# Patient Record
Sex: FEMALE | Race: OTHER | HISPANIC OR LATINO | URBAN - METROPOLITAN AREA
[De-identification: names, ages, dates, MRNs, and addresses within clinical notes are randomized per-mention and may not be internally consistent; named-entity substitution may affect disease eponyms.]

---

## 2022-12-14 ENCOUNTER — EMERGENCY (EMERGENCY)
Facility: HOSPITAL | Age: 47
LOS: 1 days | Discharge: HOME | End: 2022-12-14
Attending: STUDENT IN AN ORGANIZED HEALTH CARE EDUCATION/TRAINING PROGRAM | Admitting: STUDENT IN AN ORGANIZED HEALTH CARE EDUCATION/TRAINING PROGRAM

## 2022-12-14 VITALS
HEART RATE: 89 BPM | SYSTOLIC BLOOD PRESSURE: 97 MMHG | DIASTOLIC BLOOD PRESSURE: 62 MMHG | OXYGEN SATURATION: 100 % | RESPIRATION RATE: 18 BRPM

## 2022-12-14 VITALS
DIASTOLIC BLOOD PRESSURE: 52 MMHG | TEMPERATURE: 98 F | RESPIRATION RATE: 18 BRPM | SYSTOLIC BLOOD PRESSURE: 82 MMHG | OXYGEN SATURATION: 100 % | HEART RATE: 100 BPM

## 2022-12-14 DIAGNOSIS — Z98.84 BARIATRIC SURGERY STATUS: Chronic | ICD-10-CM

## 2022-12-14 LAB
ALBUMIN SERPL ELPH-MCNC: 3.8 G/DL — SIGNIFICANT CHANGE UP (ref 3.3–5)
ALP SERPL-CCNC: 80 U/L — SIGNIFICANT CHANGE UP (ref 40–120)
ALT FLD-CCNC: 34 U/L — HIGH (ref 4–33)
ANION GAP SERPL CALC-SCNC: 14 MMOL/L — SIGNIFICANT CHANGE UP (ref 7–14)
AST SERPL-CCNC: 28 U/L — SIGNIFICANT CHANGE UP (ref 4–32)
BASE EXCESS BLDV CALC-SCNC: 0.9 MMOL/L — SIGNIFICANT CHANGE UP (ref -2–3)
BILIRUB SERPL-MCNC: 0.4 MG/DL — SIGNIFICANT CHANGE UP (ref 0.2–1.2)
BLD GP AB SCN SERPL QL: NEGATIVE — SIGNIFICANT CHANGE UP
BLOOD GAS VENOUS COMPREHENSIVE RESULT: SIGNIFICANT CHANGE UP
BUN SERPL-MCNC: 10 MG/DL — SIGNIFICANT CHANGE UP (ref 7–23)
CALCIUM SERPL-MCNC: 8.9 MG/DL — SIGNIFICANT CHANGE UP (ref 8.4–10.5)
CHLORIDE BLDV-SCNC: 105 MMOL/L — SIGNIFICANT CHANGE UP (ref 96–108)
CHLORIDE SERPL-SCNC: 103 MMOL/L — SIGNIFICANT CHANGE UP (ref 98–107)
CO2 BLDV-SCNC: 29 MMOL/L — HIGH (ref 22–26)
CO2 SERPL-SCNC: 20 MMOL/L — LOW (ref 22–31)
CREAT SERPL-MCNC: 0.52 MG/DL — SIGNIFICANT CHANGE UP (ref 0.5–1.3)
EGFR: 115 ML/MIN/1.73M2 — SIGNIFICANT CHANGE UP
FLUAV AG NPH QL: SIGNIFICANT CHANGE UP
FLUBV AG NPH QL: SIGNIFICANT CHANGE UP
GAS PNL BLDV: 135 MMOL/L — LOW (ref 136–145)
GAS PNL BLDV: SIGNIFICANT CHANGE UP
GLUCOSE BLDV-MCNC: 77 MG/DL — SIGNIFICANT CHANGE UP (ref 70–99)
GLUCOSE SERPL-MCNC: 79 MG/DL — SIGNIFICANT CHANGE UP (ref 70–99)
HCO3 BLDV-SCNC: 27 MMOL/L — SIGNIFICANT CHANGE UP (ref 22–29)
HCT VFR BLD CALC: 32.7 % — LOW (ref 34.5–45)
HCT VFR BLDA CALC: 30 % — LOW (ref 34.5–46.5)
HGB BLD CALC-MCNC: 10.1 G/DL — LOW (ref 11.5–15.5)
HGB BLD-MCNC: 9.7 G/DL — LOW (ref 11.5–15.5)
INR BLD: 1.1 RATIO — SIGNIFICANT CHANGE UP (ref 0.88–1.16)
LACTATE BLDV-MCNC: 1.1 MMOL/L — SIGNIFICANT CHANGE UP (ref 0.5–2)
MCHC RBC-ENTMCNC: 21.4 PG — LOW (ref 27–34)
MCHC RBC-ENTMCNC: 29.7 GM/DL — LOW (ref 32–36)
MCV RBC AUTO: 72 FL — LOW (ref 80–100)
NRBC # BLD: 0 /100 WBCS — SIGNIFICANT CHANGE UP (ref 0–0)
NRBC # FLD: 0 K/UL — SIGNIFICANT CHANGE UP (ref 0–0)
PCO2 BLDV: 52 MMHG — HIGH (ref 39–42)
PH BLDV: 7.33 — SIGNIFICANT CHANGE UP (ref 7.32–7.43)
PLATELET # BLD AUTO: 387 K/UL — SIGNIFICANT CHANGE UP (ref 150–400)
PO2 BLDV: 35 MMHG — SIGNIFICANT CHANGE UP
POTASSIUM BLDV-SCNC: 4 MMOL/L — SIGNIFICANT CHANGE UP (ref 3.5–5.1)
POTASSIUM SERPL-MCNC: 4.1 MMOL/L — SIGNIFICANT CHANGE UP (ref 3.5–5.3)
POTASSIUM SERPL-SCNC: 4.1 MMOL/L — SIGNIFICANT CHANGE UP (ref 3.5–5.3)
PROT SERPL-MCNC: 7.1 G/DL — SIGNIFICANT CHANGE UP (ref 6–8.3)
PROTHROM AB SERPL-ACNC: 12.8 SEC — SIGNIFICANT CHANGE UP (ref 10.5–13.4)
RBC # BLD: 4.54 M/UL — SIGNIFICANT CHANGE UP (ref 3.8–5.2)
RBC # FLD: 16.9 % — HIGH (ref 10.3–14.5)
RH IG SCN BLD-IMP: POSITIVE — SIGNIFICANT CHANGE UP
RSV RNA NPH QL NAA+NON-PROBE: SIGNIFICANT CHANGE UP
SAO2 % BLDV: 57.6 % — SIGNIFICANT CHANGE UP
SARS-COV-2 RNA SPEC QL NAA+PROBE: SIGNIFICANT CHANGE UP
SODIUM SERPL-SCNC: 137 MMOL/L — SIGNIFICANT CHANGE UP (ref 135–145)
WBC # BLD: 7.95 K/UL — SIGNIFICANT CHANGE UP (ref 3.8–10.5)
WBC # FLD AUTO: 7.95 K/UL — SIGNIFICANT CHANGE UP (ref 3.8–10.5)

## 2022-12-14 PROCEDURE — 99284 EMERGENCY DEPT VISIT MOD MDM: CPT

## 2022-12-14 NOTE — ED PROVIDER NOTE - CLINICAL SUMMARY MEDICAL DECISION MAKING FREE TEXT BOX
47-year-old female past medical history of "mini sleeve" bariatric surgery at [facility] presents to the ER for abnormal labs.  Patient was told she likely has low hemoglobin when receiving B12 shot today for progressive weakness, palpitations yesterday, and exertional shortness of breath.  Patient denies any abdominal pain at this time.  History of multiple transfusions in the past and states worsened after her Saxenda dose was increased to 2.6 mg.  Patient states that her  is sick at home and has been coughing.  Likely anemia due to malabsorption versus low hemoglobin, patient states she did not have a recent hemoglobin to corroborate.  Will obtain CBC, CMP, VBG.  PRBC as needed.  Will screen for viral etiology with RVP.  Symptomatic control as needed.  No current concern for acute structural issue due to bariatric surgery.

## 2022-12-14 NOTE — ED PROVIDER NOTE - OBJECTIVE STATEMENT
47-year-old female past medical history of "mini sleeve" bariatric surgery at [facility] presents to the ER for abnormal labs.  Patient was told she likely has low hemoglobin when receiving B12 shot today for progressive weakness, palpitations yesterday, and exertional shortness of breath.  Patient denies any abdominal pain at this time.  History of multiple transfusions in the past and states worsened after her Saxenda dose was increased to 2.6 mg.  No other current complaints at this time. 47-year-old female past medical history of "mini sleeve" bariatric surgery at [facility] presents to the ER for abnormal labs.  Patient was told she has a low hemoglobin last week of 10, received B12 shot today for progressive weakness and fatigue for past 2 weeks. Pt c/o chronic headache and chronic palpitations worse last few days. Associated with new exertional shortness of breath for past days since increasing Saxenda to 2.6mg and pt believes is a side effect.  Patient denies any abdominal pain at this time or melena.  History of a blood transfusion in the past.  No other current complaints at this time. No ocps or estrogen use, recent surgeries, hospitalizations, le edema, calf pain, hx of dvt/pe. Traveled to [place] however had sx prior to travel.  is sick at home with cough and cold.

## 2022-12-14 NOTE — ED PROVIDER NOTE - CARE PLAN
1 Principal Discharge DX:	Shortness of breath  Secondary Diagnosis:	Palpitations  Secondary Diagnosis:	Fatigue

## 2022-12-14 NOTE — ED PROVIDER NOTE - PROGRESS NOTE DETAILS
JUAN Aguilar- h/h stable. Pt seen and evaluated, states she does not want to wait for cardiac labs, tsh, cxr, or ekg for her SOB and palpitations. states she has felt this way for awhile and has to see her pmd and gastric sleeve surgeon anyway and would prefer to follow up outpatient. states she was most concerned about her hg. will dc home.

## 2022-12-14 NOTE — ED PROVIDER NOTE - PHYSICAL EXAMINATION
GEN - NAD; A&O x3   HEAD - NC/AT   EYES- PERRL, EOMI, pale conjunctiva  ENT: Airway patent, mmm, Oral cavity and pharynx normal. No inflammation, swelling, exudate, or lesions.  NECK: Neck supple, non-tender without lymphadenopathy, no masses.  PULMONARY - CTA b/l, symmetric breath sounds. No W/R/R.  CARDIAC -s1s2, RRR, no M,G,R, No JVD  ABDOMEN - +BS, ND, NT, soft, no guarding, no rebound, no masses , no rigidity  EXTREMITIES - FROM, symmetric pulses, capillary refill < 2 seconds, no edema, 5/5 strength in b/l UE and LE  SKIN - no rash or bruising   NEUROLOGIC - alert, speech clear, no focal deficits  PSYCH -nl mood/affect, nl insight.

## 2022-12-14 NOTE — ED PROVIDER NOTE - NS ED ROS FT
ROS   GENERAL: No fever, no chills, + malaise  ENT: No earache, no coryza, no sore throat   NECK: No stiffness, no swollen glands, no dysphagia   RESPIRATORY: No cough, + intermittent dyspnea, no pleuritic chest pain   HEART: no chest pain, + intermittent palpitations, no edema, no jvd   ABDOMEN: No abdominal pain, no nausea, no vomiting, no diarrhea   : No dysuria, no increase frequency, no urgency, No discharge   MUSCULAR-SKELETAL: No myalgia, no arthralgia   NEUROLOGY: No headache, no vertigo, no paresthesia, no focal deficits, no diplopia   SKIN: No rash, no evidence of trauma  All other ROS are negative

## 2022-12-14 NOTE — ED PROVIDER NOTE - NSFOLLOWUPINSTRUCTIONS_ED_ALL_ED_FT
Follow up with your Gastric Sleeve Surgeon  See your primary care provider within 48 hours  Take copy of results with you    Call [phone] for covid test results      Worsening, continued or new concerning symptoms return to the emergency department.

## 2022-12-14 NOTE — ED ADULT NURSE NOTE - CHIEF COMPLAINT QUOTE
Patient states she is anemic and had labs drawn and was told to come to ER for abnormal labs. BP is low in triage and patient has a c/o headache.

## 2022-12-14 NOTE — ED PROVIDER NOTE - PATIENT PORTAL LINK FT
You can access the FollowMyHealth Patient Portal offered by [facility] by registering at the following website: [email or web address]. By joining [facility]’s FollowMyHealth portal, you will also be able to view your health information using other applications (apps) compatible with our system.

## 2022-12-14 NOTE — ED PROVIDER NOTE - ATTENDING APP SHARED VISIT CONTRIBUTION OF CARE
I have personally performed a history and physical examination of the patient and discussed management with the JULY as well as the patient.  I reviewed the JULY's note and agree with the documented findings and plan of care.  I have authored and modified critical sections of the Provider Note, including but not limited to HPI, Physical Exam and MDM.    47-year-old female past medical history of "mini sleeve" bariatric surgery at [facility] presents to the ER for abnormal labs.  Patient was told she likely has low hemoglobin when receiving B12 shot today for progressive weakness, palpitations yesterday, and exertional shortness of breath.  Patient denies any abdominal pain at this time.  History of multiple transfusions in the past and states worsened after her Saxenda dose was increased to 2.6 mg.  Patient states that her  is sick at home and has been coughing.  Likely anemia due to malabsorption versus low hemoglobin, patient states she did not have a recent hemoglobin to corroborate.  Will obtain CBC, CMP, VBG.  PRBC as needed.  Will screen for viral etiology with RVP.  Symptomatic control as needed.  No current concern for acute structural issue due to bariatric surgery.

## 2022-12-14 NOTE — ED PROVIDER NOTE - NS ED ATTENDING STATEMENT MOD
This was a shared visit with the JULY. I reviewed and verified the documentation and independently performed the documented:

## 2022-12-14 NOTE — ED ADULT TRIAGE NOTE - CHIEF COMPLAINT QUOTE
Patient states she is anemic and had labs drawn and was told to come to ER for abnormal labs Patient states she is anemic and had labs drawn and was told to come to ER for abnormal labs. BP is low in triage and patient has a c/o headache.

## 2023-08-23 ENCOUNTER — OFFICE (OUTPATIENT)
Dept: URBAN - METROPOLITAN AREA CLINIC 28 | Facility: CLINIC | Age: 48
Setting detail: OPHTHALMOLOGY
End: 2023-08-23
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
## 2023-08-23 PROCEDURE — NO SHOW FE NO SHOW FEE: Performed by: OPHTHALMOLOGY

## 2023-11-20 ENCOUNTER — OFFICE VISIT (OUTPATIENT)
Age: 48
End: 2023-11-20
Payer: COMMERCIAL

## 2023-11-20 VITALS
HEIGHT: 65 IN | WEIGHT: 198.6 LBS | TEMPERATURE: 98.4 F | BODY MASS INDEX: 33.09 KG/M2 | HEART RATE: 79 BPM | DIASTOLIC BLOOD PRESSURE: 68 MMHG | SYSTOLIC BLOOD PRESSURE: 114 MMHG | RESPIRATION RATE: 18 BRPM

## 2023-11-20 DIAGNOSIS — K91.2 HYPOGLYCEMIA AFTER GI (GASTROINTESTINAL) SURGERY: ICD-10-CM

## 2023-11-20 DIAGNOSIS — Z12.31 SCREENING MAMMOGRAM FOR BREAST CANCER: ICD-10-CM

## 2023-11-20 DIAGNOSIS — B37.9 YEAST INFECTION: ICD-10-CM

## 2023-11-20 DIAGNOSIS — E55.9 VITAMIN D INSUFFICIENCY: ICD-10-CM

## 2023-11-20 DIAGNOSIS — Z12.11 SCREENING FOR COLORECTAL CANCER: ICD-10-CM

## 2023-11-20 DIAGNOSIS — Z13.6 ENCOUNTER FOR LIPID SCREENING FOR CARDIOVASCULAR DISEASE: ICD-10-CM

## 2023-11-20 DIAGNOSIS — E55.9 INADEQUATE VITAMIN D AND VITAMIN D DERIVATIVE INTAKE: ICD-10-CM

## 2023-11-20 DIAGNOSIS — Z13.220 ENCOUNTER FOR LIPID SCREENING FOR CARDIOVASCULAR DISEASE: ICD-10-CM

## 2023-11-20 DIAGNOSIS — Z86.2 HISTORY OF ANEMIA: ICD-10-CM

## 2023-11-20 DIAGNOSIS — Z71.89 ENCOUNTER FOR HERB AND VITAMIN SUPPLEMENT MANAGEMENT: ICD-10-CM

## 2023-11-20 DIAGNOSIS — Z00.00 HEALTHCARE MAINTENANCE: Primary | Chronic | ICD-10-CM

## 2023-11-20 DIAGNOSIS — Z12.12 SCREENING FOR COLORECTAL CANCER: ICD-10-CM

## 2023-11-20 DIAGNOSIS — Z23 ENCOUNTER FOR IMMUNIZATION: ICD-10-CM

## 2023-11-20 DIAGNOSIS — Z98.84 GASTRIC BYPASS STATUS FOR OBESITY: ICD-10-CM

## 2023-11-20 PROCEDURE — 99386 PREV VISIT NEW AGE 40-64: CPT | Performed by: FAMILY MEDICINE

## 2023-11-20 PROCEDURE — 99214 OFFICE O/P EST MOD 30 MIN: CPT | Performed by: FAMILY MEDICINE

## 2023-11-20 RX ORDER — LORATADINE 10 MG/1
10 TABLET ORAL DAILY
COMMUNITY

## 2023-11-20 RX ORDER — ASCORBIC ACID 125 MG
200 TABLET,CHEWABLE ORAL
Qty: 180 CAPSULE | Refills: 3 | Status: SHIPPED | OUTPATIENT
Start: 2023-11-20 | End: 2024-11-14

## 2023-11-20 RX ORDER — ACETAMINOPHEN 160 MG
2000 TABLET,DISINTEGRATING ORAL DAILY
Qty: 90 CAPSULE | Refills: 3 | Status: SHIPPED | OUTPATIENT
Start: 2023-11-20 | End: 2024-11-14

## 2023-11-20 RX ORDER — LIRAGLUTIDE 6 MG/ML
3 INJECTION, SOLUTION SUBCUTANEOUS DAILY
COMMUNITY

## 2023-11-20 RX ORDER — NYSTATIN 100000 [USP'U]/G
POWDER TOPICAL 3 TIMES DAILY
Qty: 60 G | Refills: 0 | Status: SHIPPED | OUTPATIENT
Start: 2023-11-20 | End: 2024-02-18

## 2023-11-20 NOTE — ASSESSMENT & PLAN NOTE
Health maintenance completed today. - Medical history reviewed, including existing medical conditions, medications, and surgeries. - Labs discussed to evaluate cholesterol, blood sugar, kidney function, liver function, and other important markers of health. - BMI evaluated and discussed   - Lifestyle and health counseling completed including diet, exercise habits, smoking status, alcohol consumption.   - Bone & Heart health reviewed, including importance of Vit D3, Vit K2, supplements provided if indicated. - Cancer screenings discussed: Mammogram/Pap smear/CT lung/colonoscopy . - Vaccination status reviewed and pertinent immunizations and booster shots discussed. - Skin examination: Discussed importance of sunscreen and other preventative measures for skin cancer.  - Mental health and wellbeing evaluated and discussed  - Family history obtained to identify any of hereditary health risks     Last screenings completed:  Colonoscopy  Not on file   Mammogram  Not on file  Pap smear  Not on file    Plan   Lab orders in place, f/u results. Preventative orders in place as recommended. Return in 3-6 months.

## 2023-11-20 NOTE — ASSESSMENT & PLAN NOTE
Gastric bypass 8 years ago. Currently on Saxenda injections, obtained by doctor in [place]. Discussed importance of scheduled eating to avoid hypoglycemic episodes. Follow-up with labs, return in 4 weeks.

## 2023-11-20 NOTE — PROGRESS NOTES
[facility]  [address], [place]    NAME: Olman Kelsey   AGE: 50 y.o. SEX: female   : 1975          Assessment and Plan:     1. Healthcare maintenance  Assessment & Plan:  Health maintenance completed today. - Medical history reviewed, including existing medical conditions, medications, and surgeries. - Labs discussed to evaluate cholesterol, blood sugar, kidney function, liver function, and other important markers of health. - BMI evaluated and discussed   - Lifestyle and health counseling completed including diet, exercise habits, smoking status, alcohol consumption.   - Bone & Heart health reviewed, including importance of Vit D3, Vit K2, supplements provided if indicated. - Cancer screenings discussed: Mammogram/Pap smear/CT lung/colonoscopy . - Vaccination status reviewed and pertinent immunizations and booster shots discussed. - Skin examination: Discussed importance of sunscreen and other preventative measures for skin cancer.  - Mental health and wellbeing evaluated and discussed  - Family history obtained to identify any of hereditary health risks     Last screenings completed:  Colonoscopy  Not on file   Mammogram  Not on file  Pap smear  Not on file    Plan   Lab orders in place, f/u results. Preventative orders in place as recommended. Return in 3-6 months. Orders:  -     TDAP VACCINE GREATER THAN OR EQUAL TO 6YO IM  -     Ambulatory referral to Gastroenterology; Future  -     Mammo screening bilateral w 3d & cad; Future; Expected date: 2023  -     Lipid Panel with Direct LDL reflex; Future  -     Vitamin D 25 hydroxy; Future  -     Menaquinone-7 (Vitamin K2) 100 MCG CAPS; Take 2 capsules (200 mcg total) by mouth daily with lunch Take with Vitamin D3  -     Cholecalciferol (Vitamin D3) 50 MCG (2000 UT) capsule;  Take 1 capsule (2,000 Units total) by mouth daily Take with Vitamin K2  -     CBC and differential; Future; Expected date: 11/20/2023  -     Comprehensive metabolic panel; Future; Expected date: 11/20/2023  -     TSH, 3rd generation with Free T4 reflex; Future; Expected date: 11/20/2023    2. Encounter for immunization  -     TDAP VACCINE GREATER THAN OR EQUAL TO 8YO IM    3. Screening for colorectal cancer  -     Ambulatory referral to Gastroenterology; Future    4. Screening mammogram for breast cancer  -     Mammo screening bilateral w 3d & cad; Future; Expected date: 11/20/2023    5. Encounter for lipid screening for cardiovascular disease  -     Lipid Panel with Direct LDL reflex; Future    6. Vitamin D insufficiency  -     Vitamin D 25 hydroxy; Future  -     Cholecalciferol (Vitamin D3) 50 MCG (2000 UT) capsule; Take 1 capsule (2,000 Units total) by mouth daily Take with Vitamin K2    7. Inadequate vitamin D and vitamin D derivative intake  -     Vitamin D 25 hydroxy; Future    8. Encounter for herb and vitamin supplement management  -     Menaquinone-7 (Vitamin K2) 100 MCG CAPS; Take 2 capsules (200 mcg total) by mouth daily with lunch Take with Vitamin D3    9. Gastric bypass status for obesity  Assessment & Plan:  8 years ago. 10. Yeast infection  Assessment & Plan:  Intermittent of the Lower abdominal skin folds, provided nystatin. Orders:  -     nystatin (MYCOSTATIN) powder; Apply topically 3 (three) times a day    11. Hypoglycemia after GI (gastrointestinal) surgery  Assessment & Plan:  Gastric bypass 8 years ago. Currently on Saxenda injections, obtained by doctor in [place]. Discussed importance of scheduled eating to avoid hypoglycemic episodes. Follow-up with labs, return in 4 weeks. Orders:  -     Comprehensive metabolic panel; Future; Expected date: 11/20/2023  -     TSH, 3rd generation with Free T4 reflex; Future; Expected date: 11/20/2023    12.  History of anemia  -     CBC and differential; Future; Expected date: 11/20/2023 Immunizations and preventive care screenings were discussed with patient today. Appropriate education was printed on patient's after visit summary. BMI Counseling: Body mass index is 33.49 kg/m². The BMI is above normal. Nutrition recommendations include decreasing portion sizes, encouraging healthy choices of fruits and vegetables, decreasing fast food intake, consuming healthier snacks, limiting drinks that contain sugar, moderation in carbohydrate intake, increasing intake of lean protein, reducing intake of saturated and trans fat and reducing intake of cholesterol. Exercise recommendations include moderate physical activity 150 minutes/week and strength training exercises. Rationale for BMI follow-up plan is due to patient being overweight or obese. Depression Screening and Follow-up Plan: Patient was screened for depression during today's encounter. They screened negative with a PHQ-2 score of 0. Counseling:  Alcohol/drug use: discussed moderation in alcohol intake, the recommendations for healthy alcohol use, and avoidance of illicit drug use. Dental Health: discussed importance of regular tooth brushing, flossing, and dental visits. Injury prevention: discussed safety/seat belts, safety helmets, smoke detectors, carbon dioxide detectors, and smoking near bedding or upholstery. Sexual health: discussed sexually transmitted diseases, partner selection, use of condoms, avoidance of unintended pregnancy, and contraceptive alternatives. Exercise: the importance of regular exercise/physical activity was discussed. Recommend exercise 3-5 times per week for at least 30 minutes. Follow-up Plan:   Return in about 4 weeks (around 12/18/2023) for chronic disease management.            Chief Complaint:   Establish Care        History of Present Illness:     Adult Annual Physical   Shaunblaze Mathew is here for a comprehensive physical exam.     Concerns  The patient reports problems - hypoglycemic episode, anemia history  . Starting to gain weight. Fainted, tired, clamping, fatigued. But the days she does not take the shot she does not have hypoglycemic issues. On saxenda, ordered by kenia in [facility]. Currently at 0.6 (2 weeks). Cannot eat dairy. Eats zero sugar. Kely Minor is currently living with  and son (15)  · Starting to gain weight. · Fainted, tired, clamping, fatigued. But the days she does not take the shot she does not have hypoglycemic issues. · On saxenda, ordered by kenia in [facility]. Currently at 0.6 (2 weeks). · Cannot eat dairy. · Eats zero sugar. .   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 Moved from [place] 2 months ago. Diet and Physical Activity  Diet/Nutrition: well balanced diet. Exercise: no formal exercise. General Health   Sleep: gets 7-8 hours of sleep on average. Hearing: normal - bilateral.  Vision: wears glasses. Dental: regular dental visits and brushes teeth twice daily. Depression Screening  PHQ-2/9 Depression Screening    Little interest or pleasure in doing things: 0 - not at all  Feeling down, depressed, or hopeless: 0 - not at all  PHQ-2 Score: 0  PHQ-2 Interpretation: Negative depression screen       Anxiety: yes, but it has improved. Past thoughts of SI/SH: no.  Past trauma/significant events in life: yes. Mom passed about 9 years ago. Kidney failure (kidney transplants on immuno suppressant. 12, 6 years each transplant), does not know why. Lymphoma. Brain tumor. Passed at late 62s. /GYN Health  Patient is: perimenopausal  Last menstrual period: 2 weeks ago, 6 tampons, very saturated on the heavy days. Heavy periods that have gotten heavier. Contraceptive method:  none .   History of STDs?: no.    Social History     Substance and Sexual Activity   Sexual Activity Not on file       Others  Safety concerns: no.  Smoking history: no.  Recreational drugs: no.  Alcohol consumption: socially       Review of Systems:   Review of Systems   Constitutional:  Negative for chills, fever and unexpected weight change. HENT:  Negative for congestion, rhinorrhea and sore throat. Eyes:  Negative for visual disturbance. Respiratory:  Negative for chest tightness, shortness of breath and wheezing. Cardiovascular:  Negative for chest pain. Gastrointestinal:  Negative for abdominal pain, constipation, diarrhea, nausea and vomiting. Endocrine: Negative for polyuria. Genitourinary:  Negative for dysuria and hematuria. Skin:  Negative for rash. Neurological:  Positive for headaches (migraine). Negative for dizziness, weakness and light-headedness. Psychiatric/Behavioral:  Negative for confusion. Past Medical History:     Past Medical History:   Diagnosis Date    Arthritis     Nikole-menopause         Past Surgical History:     Past Surgical History:   Procedure Laterality Date     SECTION  2010    GASTRIC BYPASS          Family History:     Family History   Problem Relation Age of Onset    Hypertension Mother     Diabetes Mother     Glaucoma Father     Dementia Father         Social History:    reports that she has never smoked. She has never been exposed to tobacco smoke. She has never used smokeless tobacco. She reports current alcohol use.       Current Medications:     Current Outpatient Medications:     Cholecalciferol (Vitamin D3) 50 MCG (2000 UT) capsule, Take 1 capsule (2,000 Units total) by mouth daily Take with Vitamin K2, Disp: 90 capsule, Rfl: 3    liraglutide (Saxenda) injection, Inject 3 mg under the skin daily, Disp: , Rfl:     loratadine (CLARITIN) 10 mg tablet, Take 10 mg by mouth daily, Disp: , Rfl:     Menaquinone-7 (Vitamin K2) 100 MCG CAPS, Take 2 capsules (200 mcg total) by mouth daily with lunch Take with Vitamin D3, Disp: 180 capsule, Rfl: 3    nystatin (MYCOSTATIN) powder, Apply topically 3 (three) times a day, Disp: 60 g, Rfl: 0     Allergies: Allergies   Allergen Reactions    Kiwi Extract - Food Allergy Itching and Swelling     From Climacs; swollen tongue         Physical Exam:     /68 (BP Location: Right arm, Patient Position: Sitting, Cuff Size: Large)   Pulse 79   Temp 98.4 °F (36.9 °C) (Tympanic)   Resp 18   Ht 5' 4.57" (1.64 m)   Wt 90.1 kg (198 lb 9.6 oz)   BMI 33.49 kg/m²      Physical Exam  Vitals reviewed. Constitutional:       General: She is not in acute distress. Appearance: Normal appearance. She is not ill-appearing, toxic-appearing or diaphoretic. HENT:      Head: Normocephalic and atraumatic. Right Ear: External ear normal.      Left Ear: External ear normal.      Nose: Nose normal.      Mouth/Throat:      Mouth: Mucous membranes are moist.   Eyes:      General: No scleral icterus. Right eye: No discharge. Left eye: No discharge. Extraocular Movements: Extraocular movements intact. Conjunctiva/sclera: Conjunctivae normal.   Cardiovascular:      Rate and Rhythm: Normal rate and regular rhythm. Pulses: Normal pulses. Heart sounds: Normal heart sounds. Pulmonary:      Effort: Pulmonary effort is normal. No respiratory distress. Breath sounds: Normal breath sounds. Abdominal:      Palpations: Abdomen is soft. Tenderness: There is no abdominal tenderness. Musculoskeletal:         General: No swelling. Normal range of motion. Cervical back: Normal range of motion. Skin:     General: Skin is warm and dry. Neurological:      General: No focal deficit present. Mental Status: She is alert and oriented to person, place, and time. Psychiatric:         Mood and Affect: Mood normal.         Behavior: Behavior normal.         Thought Content:  Thought content normal.           Berry Lopez MD  Family Medicine Physician   [facility]

## 2023-12-04 PROBLEM — K91.2 POSTSURGICAL MALABSORPTION: Status: ACTIVE | Noted: 2023-12-04

## 2023-12-04 PROBLEM — Z48.815 ENCOUNTER FOR SURGICAL AFTERCARE FOLLOWING SURGERY OF DIGESTIVE SYSTEM: Status: ACTIVE | Noted: 2023-11-20

## 2023-12-04 NOTE — ASSESSMENT & PLAN NOTE
-At risk for malabsorption of vitamins/minerals secondary to malabsorption and restriction of intake from bariatric surgery  -NOT Currently taking adequate postop bariatric surgery vitamin supplementation - no vitamins or minerals - advised to start bariatric MVI and calcium citrate 500mg TID ASAP - gave samples    -Obtain CBC/Metabolic panel  -Patient received education about the importance of adhering to a lifelong supplementation regimen to avoid vitamin/mineral deficiencies

## 2023-12-04 NOTE — ASSESSMENT & PLAN NOTE
-s/p mini-John-En-Y Gastric Bypass in [facility] 8 years ago. Lengthy discussion about getting on track with healthy eating - increase protein, fiber, and avoid grazing/snacking. Recommend consult with RD and MWM. Recommend 5-6 small and balanced meals/snacks to avoid hypoglycemia. Keep food logs as well. Initial: 260lbs  Current: 198lbs  Eh: 155lbs  Current BMI is Body mass index is 32.95 kg/m². Tolerating a regular diet-yes  Eating at least 60 grams of protein per day-no  Following 30/60 minute rule with liquids-no  Drinking at least 64 ounces of fluid per day- yes  Drinking carbonated beverages-yes  Sufficient exercise-no; recommend pool walking/swimming  Using NSAIDs regularly-sometimes - advised her to avoid  Using nicotine-no  Using alcohol-no.  Advised about the risks of alcohol s/p bariatric surgery and recommend avoiding all alcohol

## 2023-12-06 ENCOUNTER — OFFICE VISIT (OUTPATIENT)
Dept: BARIATRICS | Facility: CLINIC | Age: 48
End: 2023-12-06
Payer: COMMERCIAL

## 2023-12-06 VITALS
BODY MASS INDEX: 32.99 KG/M2 | HEIGHT: 65 IN | DIASTOLIC BLOOD PRESSURE: 66 MMHG | HEART RATE: 62 BPM | SYSTOLIC BLOOD PRESSURE: 108 MMHG | WEIGHT: 198 LBS

## 2023-12-06 DIAGNOSIS — B37.2 INTERTRIGINOUS CANDIDIASIS: ICD-10-CM

## 2023-12-06 DIAGNOSIS — Z48.815 ENCOUNTER FOR SURGICAL AFTERCARE FOLLOWING SURGERY OF DIGESTIVE SYSTEM: Primary | ICD-10-CM

## 2023-12-06 DIAGNOSIS — R63.5 WEIGHT GAIN FOLLOWING GASTRIC BYPASS SURGERY: ICD-10-CM

## 2023-12-06 DIAGNOSIS — E66.9 OBESITY, CLASS I, BMI 30-34.9: ICD-10-CM

## 2023-12-06 DIAGNOSIS — Z12.11 COLON CANCER SCREENING: ICD-10-CM

## 2023-12-06 DIAGNOSIS — Z98.84 WEIGHT GAIN FOLLOWING GASTRIC BYPASS SURGERY: ICD-10-CM

## 2023-12-06 DIAGNOSIS — L98.7 EXCESS SKIN: ICD-10-CM

## 2023-12-06 DIAGNOSIS — K91.2 POSTSURGICAL MALABSORPTION: ICD-10-CM

## 2023-12-06 PROCEDURE — 99205 OFFICE O/P NEW HI 60 MIN: CPT | Performed by: PHYSICIAN ASSISTANT

## 2023-12-06 NOTE — PROGRESS NOTES
Assessment/Plan:    Encounter for surgical aftercare following surgery of digestive system  -s/p mini-John-En-Y Gastric Bypass in [facility] 8 years ago. Lengthy discussion about getting on track with healthy eating - increase protein, fiber, and avoid grazing/snacking. Recommend consult with RD and MWAKHIL. Recommend 5-6 small and balanced meals/snacks to avoid hypoglycemia. Keep food logs as well. Initial: 260lbs  Current: 198lbs  Eh: 155lbs  Current BMI is Body mass index is 32.95 kg/m². Tolerating a regular diet-yes  Eating at least 60 grams of protein per day-no  Following 30/60 minute rule with liquids-no  Drinking at least 64 ounces of fluid per day- yes  Drinking carbonated beverages-yes  Sufficient exercise-no; recommend pool walking/swimming  Using NSAIDs regularly-sometimes - advised her to avoid  Using nicotine-no  Using alcohol-no. Advised about the risks of alcohol s/p bariatric surgery and recommend avoiding all alcohol      Postsurgical malabsorption  -At risk for malabsorption of vitamins/minerals secondary to malabsorption and restriction of intake from bariatric surgery  -NOT Currently taking adequate postop bariatric surgery vitamin supplementation - no vitamins or minerals - advised to start bariatric MVI and calcium citrate 500mg TID ASAP - gave samples    -Obtain CBC/Metabolic panel  -Patient received education about the importance of adhering to a lifelong supplementation regimen to avoid vitamin/mineral deficiencies        Diagnoses and all orders for this visit:    Encounter for surgical aftercare following surgery of digestive system    Postsurgical malabsorption  -     CBC and differential; Future  -     Comprehensive metabolic panel; Future  -     Folate; Future  -     Hemoglobin A1C; Future  -     Iron Panel (Includes Ferritin, Iron Sat%, Iron, and TIBC); Future  -     Zinc; Future  -     Vitamin D 25 hydroxy; Future  -     Vitamin B12; Future  -     Vitamin B1, whole blood;  Future  - Vitamin A; Future  -     PTH, intact; Future  -     Lipid panel; Future  -     TSH, 3rd generation with Free T4 reflex; Future    Colon cancer screening  -     Ambulatory referral to Gastroenterology; Future    Weight gain following gastric bypass surgery  -     FL UPPER GI UGI; Future    Intertriginous candidiasis  -     Ambulatory referral to Plastic Surgery; Future    Excess skin  -     Ambulatory referral to Plastic Surgery; Future    Obesity, Class I, BMI 30-34.9  -     Hemoglobin A1C; Future  -     Lipid panel; Future  -     TSH, 3rd generation with Free T4 reflex; Future          Subjective:      Patient ID: Lena Alas is a 50 y.o. female. -s/p mini-John-En-Y Gastric Bypass in [facility] 8 years ago. Presents to the office today to establish care and for weight regain. Tolerating diet without issues; denies N/V, dysphagia, reflux. She started experiencing dumping episodes a few times a week when she began taking Saxenda around March 2023. She recently stopped taking it - she reports only losing maybe 5lbs on this medication. No hypoglycemia since stopping the saxenda. She lost just over 100lbs s/p surgery and maintained around 170lbs (where she felt best) for about 2 years until COVID quarantine hit and she has slowly been regaining - now up 43lbs from her jessie. She would like to get back down to around 170-175lbs where she felt best.     She denies smoking, takes occasional NSAID about once every 2 months, social ETOH. No exercise d/t knee pain. Struggling with rashes under abdominal skin folds despite using nystatin powder regularly. Works remotely [employer] and commutes into [place] 2x/week.  Jamie Lloyd very supportive - present today. Both work for [employer] for kids in [place].      Diet Recall:   Upon waking - 12oz coffee w/ skim milk w/ 3 splendas   B (1:81)-  2 slices whole wheat toast w/ 2 cheese slices  58EB - oatmeal w/ skim milk and walnuts and cinnamon and splenda  Grazes/picks/snacks - veggie chips, whole grain cookies, crackers, fruit  5-6pm or later on work days - sometimes fish fried or baked chicken and sometimes rice or pasta, little pork or red meat  HS - crackers    Fluids - 64-72oz water, coke zero 24oz, rare social wine, 12-24oz coffee    The following portions of the patient's history were reviewed and updated as appropriate: allergies, current medications, past family history, past medical history, past social history, past surgical history and problem list.    Review of Systems   Constitutional:  Positive for unexpected weight change (weight regain). Negative for chills and fever. HENT:  Negative for trouble swallowing. Respiratory:  Negative for cough and shortness of breath. Cardiovascular:  Negative for chest pain and palpitations. Gastrointestinal:  Negative for abdominal pain, constipation, diarrhea, nausea and vomiting. Musculoskeletal:  Positive for arthralgias. Neurological:  Negative for dizziness. Psychiatric/Behavioral:          Denies anxiety and depression         Objective:      /66 (BP Location: Right arm, Patient Position: Sitting, Cuff Size: Large)   Pulse 62   Ht 5' 5" (1.651 m)   Wt 89.8 kg (198 lb)   LMP 12/03/2023 (Exact Date) Comment: spotting only  BMI 32.95 kg/m²     Colonoscopy-Not Completed - placed GI referral       Physical Exam  Vitals reviewed. Constitutional:       General: She is not in acute distress. Appearance: She is well-developed. HENT:      Head: Normocephalic and atraumatic. Eyes:      General: No scleral icterus. Cardiovascular:      Rate and Rhythm: Normal rate and regular rhythm. Pulmonary:      Effort: Pulmonary effort is normal. No respiratory distress. Abdominal:      General: There is no distension. Palpations: Abdomen is soft. Skin:     General: Skin is warm and dry. Neurological:      Mental Status: She is alert.    Psychiatric:         Mood and Affect: Mood normal.         Behavior: Behavior normal.           BARRIERS: none identified    GOALS:   Continued/Maintain healthy weight loss with good nutrition intakes. Adequate hydration with at least 64oz. fluid intake. Normal vitamin and mineral levels. Exercise as tolerated. Follow-up in 1 year. We kindly ask that your arrive 15 minutes before your scheduled appointment time with your provider to allow our staff to room you, get your vital signs and update your chart. Follow diet as discussed. Get lab work done in the next 2 weeks. You have been given a lab slip today. Please call the office if you need a replacement. It is recommended to check with your insurance BEFORE getting labs done to make sure they are covered by your policy. Also, please check with your PCP and other providers before getting labs to avoid duplicate labs. Make sure to HOLD any multivitamins that may contain biotin and any biotin supplements FOR 5 DAYS before any labs since it can affect the results. Follow vitamin and mineral recommendations as reviewed with you. Call our office if you have any problems with abdominal pain especially associated with fever, chills, nausea, vomiting or any other concerns. All  Post-bariatric surgery patients should be aware that very small quantities of any alcohol can cause impairment and it is very possible not to feel the effect. The effect can be in the system for several hours. It is also a stomach irritant. It is advised to AVOID alcohol, Nonsteroidal antiinflammatory drugs (NSAIDS) and nicotine of all forms . Any of these can cause stomach irritation/pain.

## 2023-12-09 ENCOUNTER — LAB (OUTPATIENT)
Age: 48
End: 2023-12-09
Payer: COMMERCIAL

## 2023-12-09 DIAGNOSIS — K91.2 POSTSURGICAL MALABSORPTION: ICD-10-CM

## 2023-12-09 DIAGNOSIS — E66.9 OBESITY, CLASS I, BMI 30-34.9: ICD-10-CM

## 2023-12-09 LAB
25(OH)D3 SERPL-MCNC: 18.5 NG/ML (ref 30–100)
ALBUMIN SERPL BCP-MCNC: 4.2 G/DL (ref 3.5–5)
ALP SERPL-CCNC: 77 U/L (ref 34–104)
ALT SERPL W P-5'-P-CCNC: 55 U/L (ref 7–52)
ANION GAP SERPL CALCULATED.3IONS-SCNC: 6 MMOL/L
AST SERPL W P-5'-P-CCNC: 47 U/L (ref 13–39)
BASOPHILS # BLD AUTO: 0.06 THOUSANDS/ÂΜL (ref 0–0.1)
BASOPHILS NFR BLD AUTO: 1 % (ref 0–1)
BILIRUB SERPL-MCNC: 0.56 MG/DL (ref 0.2–1)
BUN SERPL-MCNC: 11 MG/DL (ref 5–25)
CALCIUM SERPL-MCNC: 9.2 MG/DL (ref 8.4–10.2)
CHLORIDE SERPL-SCNC: 106 MMOL/L (ref 96–108)
CHOLEST SERPL-MCNC: 202 MG/DL
CO2 SERPL-SCNC: 29 MMOL/L (ref 21–32)
CREAT SERPL-MCNC: 0.66 MG/DL (ref 0.6–1.3)
EOSINOPHIL # BLD AUTO: 0.23 THOUSAND/ÂΜL (ref 0–0.61)
EOSINOPHIL NFR BLD AUTO: 4 % (ref 0–6)
ERYTHROCYTE [DISTWIDTH] IN BLOOD BY AUTOMATED COUNT: 14.2 % (ref 11.6–15.1)
FERRITIN SERPL-MCNC: 23 NG/ML (ref 11–307)
FOLATE SERPL-MCNC: 7.3 NG/ML
GFR SERPL CREATININE-BSD FRML MDRD: 104 ML/MIN/1.73SQ M
GLUCOSE P FAST SERPL-MCNC: 88 MG/DL (ref 65–99)
HCT VFR BLD AUTO: 44 % (ref 34.8–46.1)
HDLC SERPL-MCNC: 56 MG/DL
HGB BLD-MCNC: 14.6 G/DL (ref 11.5–15.4)
IMM GRANULOCYTES # BLD AUTO: 0.01 THOUSAND/UL (ref 0–0.2)
IMM GRANULOCYTES NFR BLD AUTO: 0 % (ref 0–2)
IRON SATN MFR SERPL: 29 % (ref 15–50)
IRON SERPL-MCNC: 121 UG/DL (ref 50–212)
LDLC SERPL CALC-MCNC: 127 MG/DL (ref 0–100)
LYMPHOCYTES # BLD AUTO: 2.25 THOUSANDS/ÂΜL (ref 0.6–4.47)
LYMPHOCYTES NFR BLD AUTO: 43 % (ref 14–44)
MCH RBC QN AUTO: 28.9 PG (ref 26.8–34.3)
MCHC RBC AUTO-ENTMCNC: 33.2 G/DL (ref 31.4–37.4)
MCV RBC AUTO: 87 FL (ref 82–98)
MONOCYTES # BLD AUTO: 0.32 THOUSAND/ÂΜL (ref 0.17–1.22)
MONOCYTES NFR BLD AUTO: 6 % (ref 4–12)
NEUTROPHILS # BLD AUTO: 2.32 THOUSANDS/ÂΜL (ref 1.85–7.62)
NEUTS SEG NFR BLD AUTO: 46 % (ref 43–75)
NONHDLC SERPL-MCNC: 146 MG/DL
NRBC BLD AUTO-RTO: 0 /100 WBCS
PLATELET # BLD AUTO: 323 THOUSANDS/UL (ref 149–390)
PMV BLD AUTO: 10.9 FL (ref 8.9–12.7)
POTASSIUM SERPL-SCNC: 4.9 MMOL/L (ref 3.5–5.3)
PROT SERPL-MCNC: 7.5 G/DL (ref 6.4–8.4)
PTH-INTACT SERPL-MCNC: 70.3 PG/ML (ref 12–88)
RBC # BLD AUTO: 5.06 MILLION/UL (ref 3.81–5.12)
SODIUM SERPL-SCNC: 141 MMOL/L (ref 135–147)
TIBC SERPL-MCNC: 411 UG/DL (ref 250–450)
TRIGL SERPL-MCNC: 93 MG/DL
TSH SERPL DL<=0.05 MIU/L-ACNC: 1.82 UIU/ML (ref 0.45–4.5)
UIBC SERPL-MCNC: 290 UG/DL (ref 155–355)
VIT B12 SERPL-MCNC: 154 PG/ML (ref 180–914)
WBC # BLD AUTO: 5.19 THOUSAND/UL (ref 4.31–10.16)

## 2023-12-09 PROCEDURE — 84590 ASSAY OF VITAMIN A: CPT

## 2023-12-09 PROCEDURE — 84425 ASSAY OF VITAMIN B-1: CPT

## 2023-12-09 PROCEDURE — 85025 COMPLETE CBC W/AUTO DIFF WBC: CPT

## 2023-12-09 PROCEDURE — 36415 COLL VENOUS BLD VENIPUNCTURE: CPT

## 2023-12-09 PROCEDURE — 82746 ASSAY OF FOLIC ACID SERUM: CPT

## 2023-12-09 PROCEDURE — 82607 VITAMIN B-12: CPT

## 2023-12-09 PROCEDURE — 80053 COMPREHEN METABOLIC PANEL: CPT

## 2023-12-09 PROCEDURE — 83970 ASSAY OF PARATHORMONE: CPT

## 2023-12-09 PROCEDURE — 83550 IRON BINDING TEST: CPT

## 2023-12-09 PROCEDURE — 82306 VITAMIN D 25 HYDROXY: CPT

## 2023-12-09 PROCEDURE — 80061 LIPID PANEL: CPT

## 2023-12-09 PROCEDURE — 83036 HEMOGLOBIN GLYCOSYLATED A1C: CPT

## 2023-12-09 PROCEDURE — 83540 ASSAY OF IRON: CPT

## 2023-12-09 PROCEDURE — 84443 ASSAY THYROID STIM HORMONE: CPT

## 2023-12-09 PROCEDURE — 82728 ASSAY OF FERRITIN: CPT

## 2023-12-09 PROCEDURE — 84630 ASSAY OF ZINC: CPT

## 2023-12-10 LAB
EST. AVERAGE GLUCOSE BLD GHB EST-MCNC: 111 MG/DL
HBA1C MFR BLD: 5.5 %

## 2023-12-11 ENCOUNTER — CLINICAL SUPPORT (OUTPATIENT)
Dept: BARIATRICS | Facility: CLINIC | Age: 48
End: 2023-12-11
Payer: COMMERCIAL

## 2023-12-11 ENCOUNTER — TELEPHONE (OUTPATIENT)
Dept: BARIATRICS | Facility: CLINIC | Age: 48
End: 2023-12-11

## 2023-12-11 ENCOUNTER — OFFICE VISIT (OUTPATIENT)
Dept: BARIATRICS | Facility: CLINIC | Age: 48
End: 2023-12-11

## 2023-12-11 VITALS — BODY MASS INDEX: 33.09 KG/M2 | HEIGHT: 65 IN | WEIGHT: 198.6 LBS

## 2023-12-11 DIAGNOSIS — R63.5 ABNORMAL WEIGHT GAIN: ICD-10-CM

## 2023-12-11 DIAGNOSIS — K91.2 POSTSURGICAL MALABSORPTION: Primary | ICD-10-CM

## 2023-12-11 DIAGNOSIS — E53.8 VITAMIN B12 DEFICIENCY: Primary | ICD-10-CM

## 2023-12-11 DIAGNOSIS — R79.0 LOW FERRITIN: ICD-10-CM

## 2023-12-11 DIAGNOSIS — E53.8 VITAMIN B12 DEFICIENCY: ICD-10-CM

## 2023-12-11 DIAGNOSIS — E55.9 VITAMIN D DEFICIENCY: ICD-10-CM

## 2023-12-11 PROCEDURE — RECHECK

## 2023-12-11 RX ORDER — CYANOCOBALAMIN 1000 UG/ML
1000 INJECTION, SOLUTION INTRAMUSCULAR; SUBCUTANEOUS WEEKLY
Status: SHIPPED | OUTPATIENT
Start: 2023-12-11 | End: 2024-01-08

## 2023-12-11 RX ORDER — ERGOCALCIFEROL 1.25 MG/1
50000 CAPSULE ORAL
Qty: 24 CAPSULE | Refills: 0 | Status: SHIPPED | OUTPATIENT
Start: 2023-12-11

## 2023-12-11 RX ADMIN — CYANOCOBALAMIN 1000 MCG: 1000 INJECTION, SOLUTION INTRAMUSCULAR; SUBCUTANEOUS at 09:44

## 2023-12-11 NOTE — PROGRESS NOTES
Bariatric Follow Up Nutrition Note      Type of surgery  Gastric bypass: laparoscopic  Surgery Date:   7 years  post-op  Surgeon: Shelley-997 Diaz H Gala1 TORITO/Toro Diaz Final  50 y.o.  female  Height 5' 5" (1.651 m), weight 90.1 kg (198 lb 9.6 oz), last menstrual period 2023. Highest:  300#  Sx wt:  260#  Eh:  155#--didn't feel healthy, wanted to gain some weight  Was at 172# when COVID happened, and increased to where she is now.    Wants to get skin removal and lose about 150-20lbs    Weight on Day of Weight Loss Surgery: 260#  Weight in (lb) to have BMI = 25: 150.1#  Pre-Op Excess Wt: 110#  Post-Op Wt Loss: 62#/ 56% EBWL in 7 year(s)    Review of History and Medications   Past Medical History:   Diagnosis Date    Arthritis     Nikole-menopause      Past Surgical History:   Procedure Laterality Date     SECTION      GASTRIC BYPASS       Social History     Socioeconomic History    Marital status: /Civil Union     Spouse name: Not on file    Number of children: Not on file    Years of education: Not on file    Highest education level: Not on file   Occupational History    Not on file   Tobacco Use    Smoking status: Never     Passive exposure: Never    Smokeless tobacco: Never   Vaping Use    Vaping Use: Never used   Substance and Sexual Activity    Alcohol use: Yes     Comment: socially    Drug use: Not on file    Sexual activity: Not on file   Other Topics Concern    Not on file   Social History Narrative    Not on file     Social Determinants of Health     Financial Resource Strain: Not on file   Food Insecurity: Not on file   Transportation Needs: Not on file   Physical Activity: Not on file   Stress: Not on file   Social Connections: Not on file   Intimate Partner Violence: Not on file   Housing Stability: Not on file       Current Outpatient Medications:     Cholecalciferol (Vitamin D3) 50 MCG (2000 UT) capsule, Take 1 capsule (2,000 Units total) by mouth daily Take with Vitamin K2, Disp: 90 capsule, Rfl: 3    ergocalciferol (VITAMIN D2) 50,000 units, Take 1 capsule (50,000 Units total) by mouth 2 (two) times a week with meals, Disp: 24 capsule, Rfl: 0    liraglutide (Saxenda) injection, Inject 3 mg under the skin daily (Patient not taking: Reported on 12/6/2023), Disp: , Rfl:     loratadine (CLARITIN) 10 mg tablet, Take 10 mg by mouth daily, Disp: , Rfl:     Menaquinone-7 (Vitamin K2) 100 MCG CAPS, Take 2 capsules (200 mcg total) by mouth daily with lunch Take with Vitamin D3, Disp: 180 capsule, Rfl: 3    nystatin (MYCOSTATIN) powder, Apply topically 3 (three) times a day, Disp: 60 g, Rfl: 0    Current Facility-Administered Medications:     cyanocobalamin injection 1,000 mcg, 1,000 mcg, Intramuscular, Weekly, Ricardo Anthony PA-C    Food Intake and Lifestyle Assessment   Food Intake Assessment completed via 24 hour recall  Moved from [place] to [place], was in transition for about 5 months where they were living in hotels and Air B&Bs so was eating out a lot more  BS going low, eats small snacks through the day  Tues + Thurs goes into [place] for work:  will eat 1/2 castanon egg and cheese on bread, nothing to eat between 10am-4pm, then will snack on peanuts/almonds/[place] biscuits on way home. Yesterday:  8am-Coffee with skim milk + 4 splenda  Breakfast: 10am-oatmeal with skim milk + walnuts + splenda  1.5-2hrs has diarrhea  Snack: 2hrs later-banana  Then some cherrios dry  Handful of corn chips  Up until about 3pm.    Then had a low BS (shakey, tingling, almost blacked out--so went for candy, but didn't bring up sugar quick enough, so went for Gingerale (~1L) and felt better)  Took a 3 hr nap  Last night just picked on some air fried spring roll + 3 mozz sticks + 1/2 chicken duyen.   Usually she will cook when she is at home or will do something quick (chipotle) on days she works in [address] [place] [address] after dinner had some cinn cheerios     Beverage intake: water and coffee/tea--2 coffees will have 2 coffees (1 hot and 1 cold) sometimes with ff whipped cream  Diet texture/stage: regular  Protein supplement: none at this time  Estimated protein intake per day: <60fm  Estimated fluid intake per day: 5-6 bottles, 1-2 venti coffee  Meals eaten away from home: was doing more since was moving  Typical meal pattern: 2 meals per day and picks all day on meals/snacks  Eating Behaviors: Frequent snacking/ grazing, Mindless eating, Emotional eating, and carbs    Food allergies or intolerances: -  Cultural or Confucianism considerations: -    Physical Assessment  Nutrition Related Findings  Low blood sugar  perimenopause    Physical Activity  Types of exercise: walking the dog and goes to [facility] for [facility] 2x/week so a lot of walking in her commute. Used to do [place]  Current physical limitations: low BS at times which scares her. Psychosocial Assessment   Support systems: spouse friend(s) relative(s)  Socioeconomic factors: none    Nutrition Diagnosis  Diagnosis: Inappropriate intake of carbohydrates (NI-5.8.3) and Inadequate protein intake (NI-5.7. 3)  Related to: Food and nutrition-related knowledge deficit  As Evidenced by: Reports of disorded eating patterns and reports of low blood sugars     Interventions and Teaching   Patient educated on post-op nutrition guidelines. Patient educated and handouts provided.   Adequate hydration  Weight loss plateaus/ possibility of weight regain  Exercise  Nutrition considerations after surgery  Protein supplements  Meal planning and preparation  Appropriate carbohydrate, protein, and fat intake, and food/fluid choices to maximize safe weight loss, nutrient intake, and tolerance   Dietary and lifestyle changes  Possible problems with poor eating habits  Intuitive eating  Techniques for self monitoring and keeping daily food journal  Potential for food intolerance after surgery, and ways to deal with them including: lactose intolerance, nausea, reflux, vomiting, diarrhea, food intolerance, appetite changes, gas  Vitamin / Mineral supplementation of Multivitamin with minerals, Calcium, Vitamin B12, Iron, and Vitamin D    Education provided to: patient    Barriers to learning: No barriers identified    Readiness to change: relapsing    Comprehension: verbalizes understanding     Expected Compliance: good    Pt presents today as a transfer pt s/p RYGB in 2016. Overall she is ok with her current weight, but won't mind losing about 15lbs. Her biggest complaint at this time is low blood sugars that tends to happen often. Reviewed diet recall and it appears heavy in carbs and inadequate in protein. Advised pt she needs to have protein with every carb she has in order to slow the breakdown of the carb and stabilize her blood sugar. Encouraged at least 60gm protien, but preferable closer to 70-75gm. Discussed having more of a meal schedule rather than grazing through the day and not drinking with meals or for 30 mins after as to avoid flushing the food out of her stomach too quickly. Provided with a list of protein foods to try, did some menu planning and reviewed vitamins. Pt currently not taking any vitamins. Reviewed the following:    Please start taking the vitamin D deficiency prescription that I am sending for 50,000IU 2x/week with FOOD x 12 weeks - take with food for best absorption. We will repeat vitamin D lab in about 4 months. Elevated LFT's - please avoid ETOH and watch excess tylenol and discuss with PCP ASAP! B12 deficiency - Take an additional 2,000mcg sublingual B12 daily x 3 months. This can be found inexpensively OTC. I also recommend 1,000mcg IM B12 shots in the office; once weekly x 4 doses--started today. Ferritin is low - start Alessio MVI with 45mg iron and 1 Vitron C every other day - repeat panel in 6 months  Start Bariatric multi (Baripal w/45mg iron) + 1500 mg calcium spaced out by at least 2 hrs.     Goals  Food journal via marielena  Exercise 30 minutes 5 times per week  Eat 3 meals per day with protein at each meal  Use a protein shake (limit fruit in shake) for lunch  Eat something (protein w/carb) about every 2-3 hrs to avoid low BS  Don't graze on a meal, eat what she can within 30 mins  Eliminate mindless snacking  Always eat a protein with a carb  Start vitamin regimen as above  Encouraged f/u and provided with price sheet    Time Spent:   1 Hour

## 2023-12-11 NOTE — RESULT ENCOUNTER NOTE
Please review in upcoming office visit with her, thank you! You have a vitamin D deficiency. Please start taking the vitamin D deficiency prescription that I am sending for 50,000IU 2x/week with FOOD x 12 weeks - take with food for best absorption. We will repeat vitamin D lab in about 4 months.    -Elevated LFT's - please avoid ETOH and watch excess tylenol and discuss with PCP ASAP!    - B12 deficiency - Take an additional 2,000mcg sublingual B12 daily x 3 months. This can be found inexpensively OTC. I also recommend 1,000mcg IM B12 shots in the office; once weekly x 4 doses.     -Ferritin is low - start Alessio MVI with 45mg iron and 1 Vitron C every other day - repeat panel in 6 months

## 2023-12-13 LAB
VIT A SERPL-MCNC: 34.1 UG/DL (ref 20.1–62)
VIT B1 BLD-SCNC: 109.9 NMOL/L (ref 66.5–200)

## 2023-12-14 PROBLEM — Z98.84 HISTORY OF ROUX-EN-Y GASTRIC BYPASS: Status: ACTIVE | Noted: 2023-12-14

## 2023-12-14 NOTE — PROGRESS NOTES
Assessment/Plan     Abena Friedman  is a 48 y.o. female with 30.0-34.9- Obesity Class I (BMI 33 kg/m² weight 198 pounds )and obesity related co-morbidities: below as listed returns to follow up on treatment of excess body weight and associated risk factors/co-morbidities.     Assessment: Saw bariatric surgery team 12/6/2023.  Per note,-s/p mini-John-En-Y Gastric Bypass in [place] 8 years ago. Bariatric team had Lengthy discussion about getting on track with healthy eating - increase protein, fiber, and avoid grazing/snacking. Recommend consult with RD and MWM.  Recommend 5-6 small and balanced meals/snacks to avoid hypoglycemia. Keep food logs as well.       Patient states that she kept most of her weight off but after COVID she regained her weight.  She was offered another surgery by a bariatric surgeon in [facility] but declined.  She states that if she needs to do another surgery it would only be for a skin removal.      After meeting with the surgical team as above she stated that she has not completely kept up with her food logging or tracking.  She is unsure whether she is eating adequate protein buffering her carb intake she still experiences several episodes of dumping syndrome.  The patient has a variable schedule and works from home on Monday Wednesdays and Fridays and she goes into [facility] [employer] on Tuesdays and Thursdays.  Her eating is still not consistent and timely based on her varying schedules.    Stopped supplementation one year ago and getting back on track with this after recent surgical RD visit.  She states that her insurance coverage allows her to have pharmacy benefits in [facility] and surgical dietitian visits in [facility].  She is going to probe into this further to see if she needs to continue her surgical dietitian visits there or here in [place] since she moved out here in August 2023.    She reports some stress in her life.  Although living in [place] was  stressful she states that coming out here to [place] has made her worry about her son and other family related issues which does add to her stress.  Denies any depression    Reports hot flashes which have been interfering with sleep and that she feels tired during the day.  States that she had been checked for sleep apnea even prior to her bariatric surgery and was negative and she does not have any symptoms suggestive of sleep apnea now. Reoprts BP has been runnning low but is not on any medications.     Initial: 260lbs  Current: 198lbs  Eh: 155lbs    Plan : We discussed an extensive plan.  Given that patient is only 15 to 20 pounds from the weight she is comfortable at most which is 170 pounds and given challenges with antiobesity medications listed below, we will currently start with a conservative approach  - Could consider healthy CORE at future visits to continue to follow up with Pamela at future visits  -.  Given her recurrent hypoglycemia, it is first important to track and address these hypoglycemic symptoms.  Recommended she follow-up with PCP or endocrine for a CGM to better monitor this.   -Better correlation of her symptomatic hypoglycemia with her intake with buffering her carbs carbs with protein   - A calorie  target was not provided and meet protein goal ( but we could consider 1400 kcal in addition to her protein goals outlined by bariatric RD)  - Add resistance 2 days a week( one day upper and one day lower body in addition to increase protein intake.  Consider free weights as she works from home 3 days a week  CGM  - Given recurrent symptomatic hypoglycemia and dumping syndrome-we will have less maneuverability with long-acting GLP-1's.  So until we have a better idea of how to address that I will hold off on starting a long-acting GLP-1, especially because it is an insulin secretagogue  -Could reconsider low-dose Saxenda after CGM and monitor for side effects  - Consider Endocrine consult for  menopausal hormone therapy.  Counseled patient that MHT may not directly help with weight loss efforts but it would help treat vasomotor symptoms which would improve her sleep and therefore improve her success with weight loss efforts and CGM follow up.  - Follow up with surgical dietitian in [place] as patients insurance covers only in [facility]. Patient reports she would check with insurance if she could continue seeing surgical RD at our center.  Pharmacy in [place] due to insurance  - Could Consider Fibrascan for elevated LFTs.     Other Goals:        Nutrition   Do not skip meals. Avoid sugary beverages. At least 64oz of water daily.    Behavioral/Stress   Food log via marielena or provided paper log (marielena options include www.Ininal.com, sparkpeople.com, loseit.com, calorieking.com, Socialeyes App). Encouraged mindful eating    Physical Activity   Increase physical activity by 10 minutes daily. Gradually increase physical activity to a goal of 5 days per week for 30 minutes of MODERATE intensity ( target 150-300 minutes  PLUS 2 days per week of FULL BODY resistance training. Progression will be addressed at follow up visits. Encouraged contemplation regarding establishing physical activity routine    Sleep   Provided sleep hygiene counseling and importance of having adequate sleep duration    Antiobesity Medications : Please see discussion above     Most recent notes and records were reviewed.        Diagnoses and all orders for this visit:    Obesity, Class I, BMI 30-34.9    History of John-en-Y gastric bypass    Hypoglycemia after GI (gastrointestinal) surgery    Yeast infection    Postsurgical malabsorption    Elevated LFTs: Could be secondary to fatty liver           Total time spent reviewing chart, interviewing patient, examining patient, discussing plan, answering all questions, and documentin minutes with >50% face-to-face time with the patient.    Follow up in approximately 3 months with me. Follow up with  surgical already either in [place] or here once a month.          Weight trajectory     Wt Readings from Last 20 Encounters:   12/11/23 90.1 kg (198 lb 9.6 oz)   12/06/23 89.8 kg (198 lb)   11/20/23 90.1 kg (198 lb 9.6 oz)         Bariatric surgeon: In [facility] in 2016  Pre-op weight:260   Eh Weight: 155   Current Weight: 198 lb  Weight regain:+40 lbs   Best weight; 170 pounds( for about 2 years until COVID quarantine hit and she has slowly been regaining )  Target Weight :170 lbs (-28 pounds)        Interim History       Nutrition   Calorie Goal:   -Protein goal per recent bariatric surgeon note 12/6/23-not adequate protein intake.  Not following 30/60-minute rule with liquids and currently drinking carbonated beverages.    -Subsequently saw surgical dietitian 12/11/2023.  Per note - Reviewed diet recall and it appears heavy in carbs and inadequate in protein.  Advised pt she needs to have protein with every carb she has in order to slow the breakdown of the carb and stabilize her blood sugar. Encouraged at least 60gm protien, but preferable closer to 70-75gm.  Discussed having more of a meal schedule rather than grazing through the day and not drinking with meals or for 30 mins after as to avoid flushing the food out of her stomach too quickly.   -NOT Currently taking adequate postop bariatric surgery vitamin supplementation - no vitamins or minerals - advised to start bariatric MVI and calcium citrate 500mg TID ASAP -given samples   -    Physical Activity ---was recommend pool walking/swimming by bariatric surgery team. walking the dog and goes to [place] for work 2x/week so a lot of walking in her commute.  Used to do [employer]   Sleep --- poor due to menopausal hot flashes    Antiobesity Medications/Medical ---   - Started Saxenda for 6 weeks, started with 0.6 mg ( 2 weeks)--> 1.2 weeks   Numbness on tongue, speaks for a living- numbness, dryness and trouble speaking.   - SE persisted so she stopped.  - Sugar levels  would go down when she was not on it,  - BP has been low - not on any medication  - Sugar went low today 4-5 x/ week  - Was going to schedule with endocrine.   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 Wants to get skin removal and lose about 15-20lbs. Her biggest complaint at this time is low blood sugars that tends to happen often.           Colonoscopy: pending   Mammogram: next visit      Objective     Vitals:    12/18/23 1337   BP: 98/64   Pulse: 96          The following portions of the patient's history were reviewed and updated as appropriate: allergies, current medications, past family history, past medical history, past social history, past surgical history, and problem list.      LMP 12/03/2023 (Exact Date) Comment: spotting only    Review of Systems   Constitutional: Negative for activity change. Fatigue  HENT: Negative for trouble swallowing.    Respiratory: Negative for shortness of breath.    Cardiovascular: Negative for chest pain, edema  Endocrine:   Gastrointestinal: Negative for abdominal pain, nausea and vomiting, acid reflux, constipation/+ diarrhea  Psychiatric/Behavioral: Negative for behavioral problems , anxiety or depression    Constitutional: Well-developed, well-.nourished    HEENT: No conjunctival pallor or jaundice  Pulmonary: No increased work of breathing or signs of respiratory distress.  CV: Well-perfused, Normal rate    Vascular: no peripheral edema  GI: Abdomen obese, Non-distended  MSK: normal range of motion, no joint tenderness  Neuro: Oriented to person, place and time. Normal Speech  Psych: Normal affect and mood. Normal thought process          Labs     Most recent labs reviewed   Lab Results   Component Value Date    SODIUM 141 12/09/2023    K 4.9 12/09/2023      12/09/2023    CO2 29 12/09/2023    AGAP 6 12/09/2023    BUN 11 12/09/2023    CREATININE 0.66 12/09/2023    GLUF 88 12/09/2023    CALCIUM 9.2 12/09/2023    AST 47 (H) 12/09/2023    ALT 55 (H) 12/09/2023    ALKPHOS 77 12/09/2023    TP 7.5 12/09/2023    TBILI 0.56 12/09/2023    EGFR 104 12/09/2023     Lab Results   Component Value Date    HGBA1C 5.5 12/09/2023     Lab Results   Component Value Date    FYH5BGIBTGSQ 1.822 12/09/2023     Lab Results   Component Value Date    CHOLESTEROL 202 (H) 12/09/2023     Lab Results   Component Value Date    HDL 56 12/09/2023     Lab Results   Component Value Date    TRIG 93 12/09/2023     Lab Results   Component Value Date    LDLCALC 127 (H) 12/09/2023       Current meds         Current Outpatient Medications:     Cholecalciferol (Vitamin D3) 50 MCG (2000 UT) capsule, Take 1 capsule (2,000 Units total) by mouth daily Take with Vitamin K2, Disp: 90 capsule, Rfl: 3    ergocalciferol (VITAMIN D2) 50,000 units, Take 1 capsule (50,000 Units total) by mouth 2 (two) times a week with meals, Disp: 24 capsule, Rfl: 0    liraglutide (Saxenda) injection, Inject 3 mg under the skin daily (Patient not taking: Reported on 12/6/2023), Disp: , Rfl:     loratadine (CLARITIN) 10 mg tablet, Take 10 mg by mouth daily, Disp: , Rfl:     Menaquinone-7 (Vitamin K2) 100 MCG CAPS, Take 2 capsules (200 mcg total) by mouth daily with lunch Take with Vitamin D3, Disp: 180 capsule, Rfl: 3    nystatin (MYCOSTATIN) powder, Apply topically 3 (three) times a day, Disp: 60 g, Rfl: 0    Current Facility-Administered Medications:     cyanocobalamin injection 1,000 mcg, 1,000 mcg, Intramuscular, Weekly, Arielle Agosto PA-C, 1,000 mcg at 12/11/23 0944

## 2023-12-16 PROBLEM — E66.811 OBESITY, CLASS I, BMI 30-34.9: Status: ACTIVE | Noted: 2023-12-16

## 2023-12-16 PROBLEM — E66.9 OBESITY, CLASS I, BMI 30-34.9: Status: ACTIVE | Noted: 2023-12-16

## 2023-12-18 ENCOUNTER — OFFICE VISIT (OUTPATIENT)
Dept: BARIATRICS | Facility: CLINIC | Age: 48
End: 2023-12-18
Payer: COMMERCIAL

## 2023-12-18 ENCOUNTER — CONSULT (OUTPATIENT)
Dept: BARIATRICS | Facility: CLINIC | Age: 48
End: 2023-12-18
Payer: COMMERCIAL

## 2023-12-18 VITALS
DIASTOLIC BLOOD PRESSURE: 64 MMHG | SYSTOLIC BLOOD PRESSURE: 98 MMHG | HEART RATE: 96 BPM | HEIGHT: 65 IN | BODY MASS INDEX: 33.12 KG/M2 | WEIGHT: 198.8 LBS

## 2023-12-18 DIAGNOSIS — Z98.84 HISTORY OF ROUX-EN-Y GASTRIC BYPASS: ICD-10-CM

## 2023-12-18 DIAGNOSIS — R79.89 ELEVATED LFTS: ICD-10-CM

## 2023-12-18 DIAGNOSIS — K91.2 POSTSURGICAL MALABSORPTION: ICD-10-CM

## 2023-12-18 DIAGNOSIS — K91.2 HYPOGLYCEMIA AFTER GI (GASTROINTESTINAL) SURGERY: ICD-10-CM

## 2023-12-18 DIAGNOSIS — E53.8 VITAMIN B12 DEFICIENCY: Primary | ICD-10-CM

## 2023-12-18 DIAGNOSIS — B37.9 YEAST INFECTION: ICD-10-CM

## 2023-12-18 DIAGNOSIS — E66.9 OBESITY, CLASS I, BMI 30-34.9: Primary | ICD-10-CM

## 2023-12-18 PROCEDURE — RECHECK

## 2023-12-18 PROCEDURE — 99215 OFFICE O/P EST HI 40 MIN: CPT | Performed by: INTERNAL MEDICINE

## 2023-12-18 RX ADMIN — CYANOCOBALAMIN 1000 MCG: 1000 INJECTION, SOLUTION INTRAMUSCULAR; SUBCUTANEOUS at 13:44

## 2023-12-19 LAB — ZINC SERPL-MCNC: 96 UG/DL (ref 44–115)

## 2024-01-01 ENCOUNTER — HOSPITAL ENCOUNTER (EMERGENCY)
Facility: HOSPITAL | Age: 49
Discharge: HOME | End: 2024-01-01
Attending: EMERGENCY MEDICINE | Admitting: EMERGENCY MEDICINE
Payer: COMMERCIAL

## 2024-01-01 VITALS
DIASTOLIC BLOOD PRESSURE: 71 MMHG | OXYGEN SATURATION: 99 % | HEART RATE: 67 BPM | SYSTOLIC BLOOD PRESSURE: 117 MMHG | RESPIRATION RATE: 17 BRPM | TEMPERATURE: 97.8 F

## 2024-01-01 DIAGNOSIS — H10.9 CONJUNCTIVITIS: Primary | ICD-10-CM

## 2024-01-01 DIAGNOSIS — H57.89 PERIORBITAL SWELLING: ICD-10-CM

## 2024-01-01 PROCEDURE — 99284 EMERGENCY DEPT VISIT MOD MDM: CPT | Performed by: NURSE PRACTITIONER

## 2024-01-01 PROCEDURE — 99282 EMERGENCY DEPT VISIT SF MDM: CPT

## 2024-01-01 RX ORDER — CEPHALEXIN 500 MG/1
500 CAPSULE ORAL EVERY 8 HOURS SCHEDULED
Qty: 30 CAPSULE | Refills: 0 | Status: SHIPPED | OUTPATIENT
Start: 2024-01-01 | End: 2024-01-11

## 2024-01-01 RX ORDER — OFLOXACIN 3 MG/ML
1 SOLUTION/ DROPS OPHTHALMIC ONCE
Status: COMPLETED | OUTPATIENT
Start: 2024-01-01 | End: 2024-01-01

## 2024-01-01 RX ADMIN — OFLOXACIN 1 DROP: 3 SOLUTION/ DROPS OPHTHALMIC at 12:52

## 2024-01-01 NOTE — ED PROVIDER NOTES
History  Chief Complaint   Patient presents with    Eye Pain     Pt c/o left eye pain/swelling and discharge x 3 days      48-year-old female presenting here with a chief complaint of left eye swelling and tenderness and some drainage for the last 3 days.  Drainage is primarily clear.  There is a small area of punctate broken skin that may represent an excess body or tiny abrasion but with the periorbital pain that the patient is having will begin antibiotic therapy      Eye Pain  Associated symptoms: no abdominal pain, no chest pain, no congestion, no cough, no diarrhea, no ear pain, no fatigue, no fever, no headaches, no rash, no shortness of breath, no sore throat, no vomiting and no wheezing        Prior to Admission Medications   Prescriptions Last Dose Informant Patient Reported? Taking?   Cholecalciferol (Vitamin D3) 50 MCG (2000 UT) capsule   No No   Sig: Take 1 capsule (2,000 Units total) by mouth daily Take with Vitamin K2   Menaquinone-7 (Vitamin K2) 100 MCG CAPS   No No   Sig: Take 2 capsules (200 mcg total) by mouth daily with lunch Take with Vitamin D3   ergocalciferol (VITAMIN D2) 50,000 units   No No   Sig: Take 1 capsule (50,000 Units total) by mouth 2 (two) times a week with meals   liraglutide (Saxenda) injection  Self Yes No   Sig: Inject 3 mg under the skin daily   Patient not taking: Reported on 2023   loratadine (CLARITIN) 10 mg tablet  Self Yes No   Sig: Take 10 mg by mouth daily   nystatin (MYCOSTATIN) powder   No No   Sig: Apply topically 3 (three) times a day      Facility-Administered Medications Last Administration Doses Remaining   cyanocobalamin injection 1,000 mcg 2023  1:44 PM 2          Past Medical History:   Diagnosis Date    Arthritis     Nikole-menopause        Past Surgical History:   Procedure Laterality Date     SECTION  2010    GASTRIC BYPASS  2017       Family History   Problem Relation Age of Onset    Hypertension Mother     Diabetes Mother     Glaucoma  Father     Dementia Father      I have reviewed and agree with the history as documented.    E-Cigarette/Vaping    E-Cigarette Use Never User      E-Cigarette/Vaping Substances    Nicotine No     THC No     CBD No     Flavoring No     Other No     Unknown No      Social History     Tobacco Use    Smoking status: Never     Passive exposure: Never    Smokeless tobacco: Never   Vaping Use    Vaping status: Never Used   Substance Use Topics    Alcohol use: Yes     Comment: socially       Review of Systems   Constitutional:  Negative for diaphoresis, fatigue and fever.   HENT:  Negative for congestion, ear pain, nosebleeds and sore throat.    Eyes:  Positive for pain, discharge, redness and itching. Negative for photophobia and visual disturbance.   Respiratory:  Negative for cough, choking, chest tightness, shortness of breath and wheezing.    Cardiovascular:  Negative for chest pain and palpitations.   Gastrointestinal:  Negative for abdominal distention, abdominal pain, diarrhea and vomiting.   Genitourinary:  Negative for dysuria, flank pain and frequency.   Musculoskeletal:  Negative for back pain, gait problem and joint swelling.   Skin:  Negative for color change and rash.   Neurological:  Negative for dizziness, syncope and headaches.   Psychiatric/Behavioral:  Negative for behavioral problems and confusion. The patient is not nervous/anxious.    All other systems reviewed and are negative.      Physical Exam  Physical Exam  Vitals and nursing note reviewed.   Constitutional:       General: She is not in acute distress.     Appearance: She is well-developed. She is not ill-appearing or toxic-appearing.   HENT:      Head: Normocephalic and atraumatic.      Nose: No rhinorrhea.      Mouth/Throat:      Mouth: Mucous membranes are moist.      Dentition: Normal dentition.   Eyes:      General:         Right eye: No discharge.         Left eye: Discharge present.     Conjunctiva/sclera:      Left eye: Left conjunctiva  is injected.      Comments: Periorbital tenderness and swelling with very mild erythema.   Cardiovascular:      Rate and Rhythm: Normal rate and regular rhythm.   Pulmonary:      Effort: Pulmonary effort is normal. No accessory muscle usage or respiratory distress.   Abdominal:      General: There is no distension.      Tenderness: There is no guarding.   Musculoskeletal:         General: Normal range of motion.      Cervical back: Normal range of motion and neck supple. No rigidity.   Skin:     General: Skin is warm and dry.   Neurological:      Mental Status: She is alert and oriented to person, place, and time.      Coordination: Coordination normal.   Psychiatric:         Behavior: Behavior is cooperative.         Vital Signs  ED Triage Vitals [01/01/24 1024]   Temperature Pulse Respirations Blood Pressure SpO2   97.8 °F (36.6 °C) 67 17 117/71 99 %      Temp Source Heart Rate Source Patient Position - Orthostatic VS BP Location FiO2 (%)   Temporal Monitor Sitting Left arm --      Pain Score       --           Vitals:    01/01/24 1024   BP: 117/71   Pulse: 67   Patient Position - Orthostatic VS: Sitting         Visual Acuity      ED Medications  Medications   ofloxacin (OCUFLOX) 0.3 % ophthalmic solution 1 drop (has no administration in time range)       Diagnostic Studies  Results Reviewed       None                   No orders to display              Procedures  Procedures         ED Course                               SBIRT 20yo+      Flowsheet Row Most Recent Value   Initial Alcohol Screen: US AUDIT-C     1. How often do you have a drink containing alcohol? 0 Filed at: 01/01/2024 1025   2. How many drinks containing alcohol do you have on a typical day you are drinking?  0 Filed at: 01/01/2024 1025   3b. FEMALE Any Age, or MALE 65+: How often do you have 4 or more drinks on one occassion? 0 Filed at: 01/01/2024 1025   Audit-C Score 0 Filed at: 01/01/2024 1025   ROSALINA: How many times in the past year have  you...    Used an illegal drug or used a prescription medication for non-medical reasons? Never Filed at: 01/01/2024 1025                      Medical Decision Making  Begin treatment for left eye conjunctivitis.  Periorbital swelling may represent early periorbital cellulitis.  Begin oral antibiotic therapy.  Return precautions discussed.             Disposition  Final diagnoses:   Conjunctivitis   Periorbital swelling     Time reflects when diagnosis was documented in both MDM as applicable and the Disposition within this note       Time User Action Codes Description Comment    1/1/2024 12:49 PM Eleazar Virgen Add [H10.9] Conjunctivitis     1/1/2024 12:49 PM Eleazar Virgen Add [H57.89] Periorbital swelling           ED Disposition       ED Disposition   Discharge    Condition   Stable    Date/Time   Mon Jan 1, 2024 1249    Comment   Abena Friedman discharge to home/self care.                   Follow-up Information       Follow up With Specialties Details Why Contact Info    Elijah Matias MD Family Medicine Schedule an appointment as soon as possible for a visit  For Recheck [address] [place] [id number] [phone]              Patient's Medications   Discharge Prescriptions    CEPHALEXIN (KEFLEX) 500 MG CAPSULE    Take 1 capsule (500 mg total) by mouth every 8 (eight) hours for 10 days       Start Date: 1/1/2024  End Date: 1/11/2024       Order Dose: 500 mg       Quantity: 30 capsule    Refills: 0       No discharge procedures on file.    PDMP Review       None            ED Provider  Electronically Signed by             EVY Dos Santos  01/01/24 8960

## 2024-01-01 NOTE — Clinical Note
Abena Friedman was seen and treated in our emergency department on 1/1/2024.                Diagnosis:     Abena  may return to work on return date.    She may return on this date: 01/03/2024         If you have any questions or concerns, please don't hesitate to call.      EVY Dos Santos    ______________________________           _______________          _______________  Hospital Representative                              Date                                Time

## 2024-01-08 ENCOUNTER — TELEPHONE (OUTPATIENT)
Dept: PLASTIC SURGERY | Facility: CLINIC | Age: 49
End: 2024-01-08

## 2024-01-08 NOTE — TELEPHONE ENCOUNTER
Lm for patient to call to review criteria for panniculectomy.  Needs 2 more months of being on nystatin.  Also need patient to verify her insurance and if we are participating.

## 2024-01-10 ENCOUNTER — OFFICE VISIT (OUTPATIENT)
Dept: FAMILY MEDICINE CLINIC | Facility: CLINIC | Age: 49
End: 2024-01-10
Payer: COMMERCIAL

## 2024-01-10 VITALS
HEART RATE: 76 BPM | WEIGHT: 201.6 LBS | HEIGHT: 65 IN | RESPIRATION RATE: 16 BRPM | BODY MASS INDEX: 33.59 KG/M2 | TEMPERATURE: 98.4 F | OXYGEN SATURATION: 98 % | DIASTOLIC BLOOD PRESSURE: 84 MMHG | SYSTOLIC BLOOD PRESSURE: 114 MMHG

## 2024-01-10 DIAGNOSIS — H10.32 ACUTE BACTERIAL CONJUNCTIVITIS OF LEFT EYE: ICD-10-CM

## 2024-01-10 DIAGNOSIS — N95.1 PERIMENOPAUSAL VASOMOTOR SYMPTOMS: Primary | ICD-10-CM

## 2024-01-10 DIAGNOSIS — F39 MOOD DISORDER (HCC): ICD-10-CM

## 2024-01-10 DIAGNOSIS — Z98.84 GASTRIC BYPASS STATUS FOR OBESITY: ICD-10-CM

## 2024-01-10 DIAGNOSIS — F41.9 ANXIETY: ICD-10-CM

## 2024-01-10 PROBLEM — M17.12 PATELLOFEMORAL ARTHRITIS OF LEFT KNEE: Status: ACTIVE | Noted: 2022-05-11

## 2024-01-10 PROCEDURE — 99214 OFFICE O/P EST MOD 30 MIN: CPT | Performed by: FAMILY MEDICINE

## 2024-01-10 RX ORDER — VENLAFAXINE HYDROCHLORIDE 37.5 MG/1
37.5 CAPSULE, EXTENDED RELEASE ORAL
Qty: 30 CAPSULE | Refills: 5 | Status: SHIPPED | OUTPATIENT
Start: 2024-01-10

## 2024-01-10 NOTE — PROGRESS NOTES
Name: Abena Friedman      : 1975      MRN: [id number]  Encounter Provider: Marvin Cabezas MD  Encounter Date: 1/10/2024   Encounter department: [facility]    Assessment & Plan     Here as a new patient. History of gastric bypass surgery w/ post surgical malabsorption, KHUSHBOO, reflux, and hypoglycemia which has improved with with higher protein diet and consistent eating. Discussed non pharmacologic and pharmacologic options for vasomotor symptoms. Agreed to effexor which should also help mood lability. Completing a course of ofloxacin and keflex for bacterial conjunctivitis and periorbital cellulitis. No evidence of cellulitis and eye exam unremarkable. F/u in 6 weeks      1. Perimenopausal vasomotor symptoms  -     venlafaxine (EFFEXOR-XR) 37.5 mg 24 hr capsule; Take 1 capsule (37.5 mg total) by mouth daily with breakfast    2. Mood disorder (HCC)  -     venlafaxine (EFFEXOR-XR) 37.5 mg 24 hr capsule; Take 1 capsule (37.5 mg total) by mouth daily with breakfast    3. Gastric bypass status for obesity    4. Anxiety    5. Acute bacterial conjunctivitis of left eye         Subjective      New patient. Moved to [place] from [place]. Last PCP was Dr. Matias. Here with .   Was experiencing low blood sugars   Believes it was due to a lack of protein.   Blood sugars improved after she started to consume more protein and eating consistently  Concerned about weight gain. Worse now that she is perimenopausal( LMP 6 weeks ago). Had a mini gastric bypass. Prior to surgery she weight 280 then lost 60 lbs. Prior to covid, weighed 175. Afraid she will regain the weight. Goal is 185  Dr in [place] started her on Saxenda 0.6 mg 4 days ago. Was previously on Saxenda intermittently for 6 months and developed dry mouth on the higher doses  Seeing Weight management .   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 Interestingly, skim milk doesn't cause GI symptoms        Review of Systems   Constitutional:  Negative for fever.   Eyes:  Positive for discharge and redness. Negative for pain and itching.   Gastrointestinal:  Positive for diarrhea (w/ dairy).   Skin:  Positive for rash.   Neurological:  Positive for weakness (improved since incoporating more protien) and light-headedness (improved since incoporating more protien).       Current Outpatient Medications on File Prior to Visit   Medication Sig   • Cholecalciferol (Vitamin D3) 50 MCG (2000 UT) capsule Take 1 capsule (2,000 Units total) by mouth daily Take with Vitamin K2   • ergocalciferol (VITAMIN D2) 50,000 units Take 1 capsule (50,000 Units total) by mouth 2 (two) times a week with meals   • loratadine (CLARITIN) 10 mg tablet Take 10 mg by mouth daily   • Menaquinone-7 (Vitamin K2) 100 MCG CAPS Take 2 capsules (200 mcg total) by mouth daily with lunch Take with Vitamin D3   • nystatin (MYCOSTATIN) powder Apply topically 3 (three) times a day   • liraglutide (Saxenda) injection Inject 3 mg under the skin daily (Patient not taking: Reported on 12/6/2023)       Objective     /84 (BP Location: Left arm, Patient Position: Sitting, Cuff Size: Large)   Pulse 76   Temp 98.4 °F (36.9 °C) (Tympanic)   Resp 16   Ht 5' 5\" (1.651 m)   Wt 91.4 kg (201 lb 9.6 oz)   SpO2 98%   BMI 33.55 kg/m²     Physical Exam  Constitutional:       General: She is not in acute distress.     Appearance: Normal appearance. She is not ill-appearing.   HENT:      Head: Normocephalic and atraumatic.      Right Ear: Tympanic membrane normal.      Left Ear: Tympanic membrane normal.   Eyes:      General:         Right eye: No discharge.         Left eye: No discharge.      Extraocular Movements: " Extraocular movements intact.   Cardiovascular:      Rate and Rhythm: Normal rate and regular rhythm.      Heart sounds: No murmur heard.  Pulmonary:      Effort: Pulmonary effort is normal. No respiratory distress.      Breath sounds: Normal breath sounds. No stridor. No wheezing, rhonchi or rales.   Abdominal:      General: There is no distension.      Palpations: Abdomen is soft. There is no mass.      Tenderness: There is no abdominal tenderness. There is no guarding or rebound.      Hernia: No hernia is present.   Lymphadenopathy:      Cervical: No cervical adenopathy.   Skin:     General: Skin is warm.      Findings: No erythema or rash.   Neurological:      Mental Status: She is alert and oriented to person, place, and time.      Gait: Gait normal.   Psychiatric:         Mood and Affect: Mood normal.         Behavior: Behavior normal.         Thought Content: Thought content normal.     Marvin Cabezas MD     Patient requests all Lab, Cardiology, and Radiology Results on their Discharge Instructions

## 2024-01-16 ENCOUNTER — TELEPHONE (OUTPATIENT)
Dept: FAMILY MEDICINE CLINIC | Facility: CLINIC | Age: 49
End: 2024-01-16

## 2024-01-17 ENCOUNTER — TELEPHONE (OUTPATIENT)
Dept: FAMILY MEDICINE CLINIC | Facility: CLINIC | Age: 49
End: 2024-01-17

## 2024-01-19 PROBLEM — Z00.00 HEALTHCARE MAINTENANCE: Status: RESOLVED | Noted: 2023-11-20 | Resolved: 2024-01-19

## 2024-01-22 ENCOUNTER — APPOINTMENT (OUTPATIENT)
Age: 49
End: 2024-01-22
Payer: COMMERCIAL

## 2024-01-22 ENCOUNTER — OFFICE VISIT (OUTPATIENT)
Age: 49
End: 2024-01-22
Payer: COMMERCIAL

## 2024-01-22 VITALS
BODY MASS INDEX: 33.28 KG/M2 | RESPIRATION RATE: 18 BRPM | OXYGEN SATURATION: 100 % | DIASTOLIC BLOOD PRESSURE: 82 MMHG | TEMPERATURE: 98 F | WEIGHT: 200 LBS | SYSTOLIC BLOOD PRESSURE: 116 MMHG | HEART RATE: 90 BPM

## 2024-01-22 DIAGNOSIS — M25.561 ACUTE PAIN OF RIGHT KNEE: ICD-10-CM

## 2024-01-22 DIAGNOSIS — S86.911A KNEE STRAIN, RIGHT, INITIAL ENCOUNTER: ICD-10-CM

## 2024-01-22 DIAGNOSIS — M25.561 ACUTE PAIN OF RIGHT KNEE: Primary | ICD-10-CM

## 2024-01-22 PROCEDURE — G0382 LEV 3 HOSP TYPE B ED VISIT: HCPCS | Performed by: PHYSICIAN ASSISTANT

## 2024-01-22 PROCEDURE — 99283 EMERGENCY DEPT VISIT LOW MDM: CPT | Performed by: PHYSICIAN ASSISTANT

## 2024-01-22 PROCEDURE — 73564 X-RAY EXAM KNEE 4 OR MORE: CPT

## 2024-01-22 RX ORDER — SENNOSIDES 8.6 MG
650 CAPSULE ORAL EVERY 8 HOURS PRN
Qty: 30 TABLET | Refills: 0 | Status: SHIPPED | OUTPATIENT
Start: 2024-01-22

## 2024-01-22 NOTE — LETTER
January 22, 2024     Patient: Abena Friedman   YOB: 1975   Date of Visit: 1/22/2024       To Whom it May Concern:    Abena Friedman was seen in my clinic on 1/22/2024. She may return to work on 1/23/2024 .    If you have any questions or concerns, please don't hesitate to call.         Sincerely,          Simon Santos PA-C        CC: No Recipients

## 2024-01-22 NOTE — PROGRESS NOTES
[facility]        NAME: Abena Friedman is a 48 y.o. female  : 1975    MRN: [id number]  DATE: 2024  TIME: 4:03 PM    Assessment and Plan   Acute pain of right knee [M25.561]  1. Acute pain of right knee  XR knee 4+ vw right injury    Ambulatory Referral to Orthopedic Surgery    Diclofenac Sodium (VOLTAREN) 1 %    acetaminophen (TYLENOL) 650 mg CR tablet    CANCELED: Ambulatory Referral to Orthopedic Surgery      2. Knee strain, right, initial encounter              Patient Instructions     Your preliminary x-rays appear normal  Orthopedic referral generated on your behalf for follow-up  Diclofenac 1% gel apply to affected area of the right knee every 6 hours as needed for pain  Tylenol 650 mg 1 tablet every 8 hours as needed for pain  Ice  Rest  Right knee brace installed    Follow up with PCP in 3-5 days.  Proceed to  ER if symptoms worsen.    Chief Complaint     Chief Complaint   Patient presents with    Knee Pain     Pt states 2 days ago she tripped and fell on her right knee. Pt states knee is swollen and painful.          History of Present Illness       48-year-old female with past medical history of bilateral tricompartment arthritis of the knees is presenting today with complaint of right knee pain exacerbation for 3 days.  The patient reports she accidentally injured her right knee 3 days ago at a friend's cocktail party.  The patient reports her right knee struck a small table and since has been in pain.  Pain is worse on weightbearing walking, and climbing steps.  Reports catching and locking of the knee.        Review of Systems   Review of Systems   Constitutional:  Negative for fever.   Respiratory:  Negative for cough.    Gastrointestinal:  Negative for nausea and vomiting.   Skin:  Negative for color change, rash and wound.   Neurological:  Negative for headaches.         Current Medications       Current Outpatient Medications:     acetaminophen (TYLENOL) 650 mg CR  tablet, Take 1 tablet (650 mg total) by mouth every 8 (eight) hours as needed for mild pain, Disp: 30 tablet, Rfl: 0    Cholecalciferol (Vitamin D3) 50 MCG (2000 UT) capsule, Take 1 capsule (2,000 Units total) by mouth daily Take with Vitamin K2, Disp: 90 capsule, Rfl: 3    Diclofenac Sodium (VOLTAREN) 1 %, Apply 2 g topically 4 (four) times a day, Disp: 50 g, Rfl: 0    ergocalciferol (VITAMIN D2) 50,000 units, Take 1 capsule (50,000 Units total) by mouth 2 (two) times a week with meals, Disp: 24 capsule, Rfl: 0    loratadine (CLARITIN) 10 mg tablet, Take 10 mg by mouth daily, Disp: , Rfl:     Menaquinone-7 (Vitamin K2) 100 MCG CAPS, Take 2 capsules (200 mcg total) by mouth daily with lunch Take with Vitamin D3, Disp: 180 capsule, Rfl: 3    nystatin (MYCOSTATIN) powder, Apply topically 3 (three) times a day, Disp: 60 g, Rfl: 0    venlafaxine (EFFEXOR-XR) 37.5 mg 24 hr capsule, Take 1 capsule (37.5 mg total) by mouth daily with breakfast, Disp: 30 capsule, Rfl: 5    liraglutide (Saxenda) injection, Inject 3 mg under the skin daily (Patient not taking: Reported on 2023), Disp: , Rfl:     Current Allergies     Allergies as of 2024 - Reviewed 2024   Allergen Reaction Noted    Kiwi extract - food allergy Itching and Swelling 08/10/2005            The following portions of the patient's history were reviewed and updated as appropriate: allergies, current medications, past family history, past medical history, past social history, past surgical history and problem list.     Past Medical History:   Diagnosis Date    Arthritis     Nikole-menopause        Past Surgical History:   Procedure Laterality Date     SECTION      GASTRIC BYPASS  2017       Family History   Problem Relation Age of Onset    Hypertension Mother     Diabetes Mother     Glaucoma Father     Dementia Father          Medications have been verified.        Objective   /82   Pulse 90   Temp 98 °F (36.7 °C)   Resp 18   Wt  90.7 kg (200 lb)   SpO2 100%   BMI 33.28 kg/m²        Physical Exam     Physical Exam  Vitals and nursing note reviewed.   Constitutional:       General: She is not in acute distress.     Appearance: Normal appearance. She is not ill-appearing.   Eyes:      General: No scleral icterus.     Extraocular Movements: Extraocular movements intact.      Conjunctiva/sclera: Conjunctivae normal.      Pupils: Pupils are equal, round, and reactive to light.   Cardiovascular:      Rate and Rhythm: Normal rate and regular rhythm.      Pulses: Normal pulses.      Heart sounds: Normal heart sounds.   Pulmonary:      Effort: Pulmonary effort is normal. No respiratory distress.      Breath sounds: Normal breath sounds.   Musculoskeletal:      Cervical back: Normal range of motion and neck supple.      Right knee: Swelling present. No deformity, effusion, erythema, ecchymosis, lacerations, bony tenderness or crepitus. Normal range of motion. Tenderness present over the medial joint line and MCL. No LCL, ACL, PCL or patellar tendon tenderness. No LCL laxity, MCL laxity, ACL laxity or PCL laxity. Normal alignment, normal meniscus and normal patellar mobility. Normal pulse.      Instability Tests: Anterior drawer test negative. Posterior drawer test negative. Anterior Lachman test negative. Medial Temi test negative and lateral Temi test negative.      Left knee: Normal.   Skin:     General: Skin is warm and dry.      Findings: No bruising, erythema or lesion.   Neurological:      Mental Status: She is alert and oriented to person, place, and time.      Motor: No weakness.      Coordination: Coordination normal.   Psychiatric:         Mood and Affect: Mood normal.         Behavior: Behavior normal.

## 2024-03-13 ENCOUNTER — OFFICE VISIT (OUTPATIENT)
Dept: FAMILY MEDICINE CLINIC | Facility: CLINIC | Age: 49
End: 2024-03-13
Payer: COMMERCIAL

## 2024-03-13 VITALS
DIASTOLIC BLOOD PRESSURE: 86 MMHG | OXYGEN SATURATION: 95 % | WEIGHT: 195.8 LBS | SYSTOLIC BLOOD PRESSURE: 114 MMHG | TEMPERATURE: 98.2 F | HEART RATE: 83 BPM | RESPIRATION RATE: 16 BRPM | BODY MASS INDEX: 32.62 KG/M2 | HEIGHT: 65 IN

## 2024-03-13 DIAGNOSIS — F39 MOOD DISORDER (HCC): ICD-10-CM

## 2024-03-13 DIAGNOSIS — Z12.4 CERVICAL CANCER SCREENING: Primary | ICD-10-CM

## 2024-03-13 DIAGNOSIS — R79.89 ELEVATED LIVER FUNCTION TESTS: ICD-10-CM

## 2024-03-13 DIAGNOSIS — N93.9 ABNORMAL UTERINE BLEEDING (AUB): ICD-10-CM

## 2024-03-13 DIAGNOSIS — F41.9 ANXIETY: ICD-10-CM

## 2024-03-13 PROCEDURE — 99214 OFFICE O/P EST MOD 30 MIN: CPT | Performed by: FAMILY MEDICINE

## 2024-03-13 RX ORDER — TRANEXAMIC ACID 650 MG/1
1300 TABLET ORAL 2 TIMES DAILY
Qty: 20 TABLET | Refills: 0 | Status: SHIPPED | OUTPATIENT
Start: 2024-03-13 | End: 2024-03-18

## 2024-03-13 RX ORDER — TIRZEPATIDE 5 MG/.5ML
INJECTION, SOLUTION SUBCUTANEOUS
COMMUNITY
Start: 2024-03-04

## 2024-03-13 NOTE — PROGRESS NOTES
Name: Abena Friedman      : 1975      MRN: [id number]  Encounter Provider: Marvin Cabezas MD  Encounter Date: 3/13/2024   Encounter department: [facility]    Assessment & Plan     Here for follow up. Pt ended up not taking venlafaxine and is no longer experiencing hot flashes. She has had AUB for the last 3 months. Periods are heavy and lasting 5-6 days. TVUS ordered. TXA x 5 days. Last pap was in May of last year. Continues to experience irritability. Pt prefers natural approach. Recommendations provided such as ashwagandha and be calm by natividad. Pt reminded to do blood work. RTC in 6 months or sooner as needed    1. Cervical cancer screening    2. Mood disorder (HCC)    3. Anxiety    4. Abnormal uterine bleeding (AUB)  -     Tranexamic Acid 650 MG TABS; Take 2 tablets (1,300 mg total) by mouth 2 (two) times a day for 5 days  -     US pelvis complete w transvaginal; Future; Expected date: 2024  -     TSH, 3rd generation; Future    5. Elevated liver function tests  -     Comprehensive metabolic panel; Future           Subjective      Here for follow up  BW not completed  Hot flashes went away  Since Dec, she's gotten her periods every 2 weeks  Periods lasting 5-6 days and are heavy  Uses 6 tampons a day  Wanted to go the natural route  Venlafaxine ordered the last visit but prefers natural approach   Had pap in May of last year that was normal  Weight  in [place] started Zepbound 2.5 mg for 4 weeks then increased to 5 mg which as started last Monday   Yesterday, she developed sore throat  No throat nausea, vomiting, tongue swelling, or SOB   Has lost 8 lbs since Dec  Plan to see a plastic surgeon to address excess skin      Review of Systems   Genitourinary:  Positive for menstrual problem and vaginal bleeding.   Psychiatric/Behavioral:  Positive for agitation. The patient is nervous/anxious.        Current Outpatient Medications on File Prior to Visit   Medication  "Sig   • acetaminophen (TYLENOL) 650 mg CR tablet Take 1 tablet (650 mg total) by mouth every 8 (eight) hours as needed for mild pain   • Cholecalciferol (Vitamin D3) 50 MCG (2000 UT) capsule Take 1 capsule (2,000 Units total) by mouth daily Take with Vitamin K2   • Diclofenac Sodium (VOLTAREN) 1 % Apply 2 g topically 4 (four) times a day   • ergocalciferol (VITAMIN D2) 50,000 units Take 1 capsule (50,000 Units total) by mouth 2 (two) times a week with meals   • loratadine (CLARITIN) 10 mg tablet Take 10 mg by mouth daily   • Menaquinone-7 (Vitamin K2) 100 MCG CAPS Take 2 capsules (200 mcg total) by mouth daily with lunch Take with Vitamin D3   • nystatin (MYCOSTATIN) powder Apply topically 3 (three) times a day   • Zepbound 5 MG/0.5ML auto-injector    • [DISCONTINUED] liraglutide (Saxenda) injection Inject 3 mg under the skin daily (Patient not taking: Reported on 12/6/2023)   • [DISCONTINUED] venlafaxine (EFFEXOR-XR) 37.5 mg 24 hr capsule Take 1 capsule (37.5 mg total) by mouth daily with breakfast (Patient not taking: Reported on 3/13/2024)       Objective     /86 (BP Location: Left arm, Patient Position: Sitting, Cuff Size: Adult)   Pulse 83   Temp 98.2 °F (36.8 °C) (Tympanic)   Resp 16   Ht 5' 5\" (1.651 m)   Wt 88.8 kg (195 lb 12.8 oz)   SpO2 95%   BMI 32.58 kg/m²     Physical Exam  Constitutional:       Appearance: Normal appearance.   HENT:      Head: Normocephalic and atraumatic.   Pulmonary:      Effort: Pulmonary effort is normal.   Skin:     General: Skin is warm.   Neurological:      Mental Status: She is alert and oriented to person, place, and time.   Psychiatric:         Mood and Affect: Mood normal.         Behavior: Behavior normal.         Thought Content: Thought content normal.       Marvin Cabezas MD    "

## 2024-03-16 ENCOUNTER — APPOINTMENT (OUTPATIENT)
Age: 49
End: 2024-03-16
Payer: COMMERCIAL

## 2024-03-16 DIAGNOSIS — N93.9 ABNORMAL UTERINE BLEEDING (AUB): ICD-10-CM

## 2024-03-16 DIAGNOSIS — R79.89 ELEVATED LIVER FUNCTION TESTS: ICD-10-CM

## 2024-03-16 LAB
ALBUMIN SERPL BCP-MCNC: 4.1 G/DL (ref 3.5–5)
ALP SERPL-CCNC: 68 U/L (ref 34–104)
ALT SERPL W P-5'-P-CCNC: 43 U/L (ref 7–52)
ANION GAP SERPL CALCULATED.3IONS-SCNC: 7 MMOL/L (ref 4–13)
AST SERPL W P-5'-P-CCNC: 30 U/L (ref 13–39)
BILIRUB SERPL-MCNC: 0.52 MG/DL (ref 0.2–1)
BUN SERPL-MCNC: 10 MG/DL (ref 5–25)
CALCIUM SERPL-MCNC: 8.8 MG/DL (ref 8.4–10.2)
CHLORIDE SERPL-SCNC: 103 MMOL/L (ref 96–108)
CO2 SERPL-SCNC: 27 MMOL/L (ref 21–32)
CREAT SERPL-MCNC: 0.6 MG/DL (ref 0.6–1.3)
GFR SERPL CREATININE-BSD FRML MDRD: 108 ML/MIN/1.73SQ M
GLUCOSE P FAST SERPL-MCNC: 71 MG/DL (ref 65–99)
POTASSIUM SERPL-SCNC: 4.1 MMOL/L (ref 3.5–5.3)
PROT SERPL-MCNC: 7.3 G/DL (ref 6.4–8.4)
SODIUM SERPL-SCNC: 137 MMOL/L (ref 135–147)
TSH SERPL DL<=0.05 MIU/L-ACNC: 2.12 UIU/ML (ref 0.45–4.5)

## 2024-03-16 PROCEDURE — 84443 ASSAY THYROID STIM HORMONE: CPT

## 2024-03-16 PROCEDURE — 80053 COMPREHEN METABOLIC PANEL: CPT

## 2024-03-16 PROCEDURE — 36415 COLL VENOUS BLD VENIPUNCTURE: CPT

## 2024-05-29 ENCOUNTER — OFFICE VISIT (OUTPATIENT)
Dept: FAMILY MEDICINE CLINIC | Facility: CLINIC | Age: 49
End: 2024-05-29
Payer: COMMERCIAL

## 2024-05-29 VITALS
HEIGHT: 65 IN | WEIGHT: 187 LBS | SYSTOLIC BLOOD PRESSURE: 110 MMHG | TEMPERATURE: 97.6 F | BODY MASS INDEX: 31.16 KG/M2 | DIASTOLIC BLOOD PRESSURE: 82 MMHG | OXYGEN SATURATION: 96 % | HEART RATE: 66 BPM | RESPIRATION RATE: 16 BRPM

## 2024-05-29 DIAGNOSIS — R00.2 PALPITATIONS: ICD-10-CM

## 2024-05-29 DIAGNOSIS — R10.13 DYSPEPSIA: ICD-10-CM

## 2024-05-29 DIAGNOSIS — E66.9 OBESITY, CLASS I, BMI 30-34.9: ICD-10-CM

## 2024-05-29 DIAGNOSIS — J45.909 ASTHMA DUE TO ENVIRONMENTAL ALLERGIES: ICD-10-CM

## 2024-05-29 DIAGNOSIS — R51.9 GENERALIZED HEADACHES: Primary | ICD-10-CM

## 2024-05-29 PROCEDURE — 99214 OFFICE O/P EST MOD 30 MIN: CPT | Performed by: FAMILY MEDICINE

## 2024-05-29 RX ORDER — SUMATRIPTAN 50 MG/1
50 TABLET, FILM COATED ORAL ONCE AS NEEDED
Qty: 10 TABLET | Refills: 0 | Status: SHIPPED | OUTPATIENT
Start: 2024-05-29

## 2024-05-29 RX ORDER — LEVOCETIRIZINE DIHYDROCHLORIDE 5 MG/1
5 TABLET, FILM COATED ORAL EVERY EVENING
Qty: 30 TABLET | Refills: 1 | Status: SHIPPED | OUTPATIENT
Start: 2024-05-29

## 2024-05-29 RX ORDER — PANTOPRAZOLE SODIUM 40 MG/1
40 TABLET, DELAYED RELEASE ORAL DAILY
Qty: 30 TABLET | Refills: 0 | Status: SHIPPED | OUTPATIENT
Start: 2024-05-29

## 2024-05-29 NOTE — PROGRESS NOTES
Ambulatory Visit  Name: Abena Friedman      : 1975      MRN: [id number]  Encounter Provider: Marvin Cabezas MD  Encounter Date: 2024   Encounter department: [facility]    Assessment & Plan   1. Generalized headaches  Comments:  Intermittent sharp shooting pain. No focal neurological deficits. History of migraines. Possibly  Orders:  -     SUMAtriptan (IMITREX) 50 mg tablet; Take 1 tablet (50 mg total) by mouth once as needed for migraine may repeat in 2 hours if necessary  2. Asthma due to environmental allergies  Comments:  Increased allergy symptoms. Tried several OTC medications without relief  Orders:  -     levocetirizine (XYZAL) 5 MG tablet; Take 1 tablet (5 mg total) by mouth every evening  3. Palpitations  Comments:  Suspect dehydration. Increase hydration. May also be a s/e wegovy. Was switched to this due to zepbound back order  4. Obesity, Class I, BMI 30-34.9  Comments:  Intolerance to wegovy.Plans to resume zepbound now that its available.  5. Dyspepsia  Comments:  indigestion and increase flactulance. Possibly s/e of wegovy. Recc 2weeks of protonix  Orders:  -     pantoprazole (PROTONIX) 40 mg tablet; Take 1 tablet (40 mg total) by mouth daily       History of Present Illness     Presents with several complaints  Headaches started 2 weeks ago   Sharp and acute  Sensitivity to light and sound  Was diagnosed with migraines in the past but not medicated  Started having palpitations last week  Chronic but is happening more frequently  Allergies have gottten worse  Claritin, zyrtec, and allegra ineffective  Was on put on wegovy because zepbound was on back order  Didn't tolerate the wegovy  Zepbound has helped with menopause symptoms    Migraine  Associated symptoms include sinus pressure.   Palpitations  Associated symptoms include congestion and headaches.     Review of Systems   HENT:  Positive for congestion, sinus pressure and sneezing.    Cardiovascular:  Positive  "for palpitations.   Neurological:  Positive for headaches.       Objective     /82 (BP Location: Left arm, Patient Position: Sitting, Cuff Size: Adult)   Pulse 66   Temp 97.6 °F (36.4 °C) (Tympanic)   Resp 16   Ht 5' 5\" (1.651 m)   Wt 84.8 kg (187 lb)   SpO2 96%   BMI 31.12 kg/m²     Physical Exam  Constitutional:       General: She is not in acute distress.     Appearance: Normal appearance. She is not ill-appearing or toxic-appearing.   HENT:      Head: Normocephalic and atraumatic.      Comments: No scalp tenderness   Eyes:      Extraocular Movements: Extraocular movements intact.      Pupils: Pupils are equal, round, and reactive to light.   Cardiovascular:      Rate and Rhythm: Normal rate and regular rhythm.      Heart sounds: No murmur heard.  Pulmonary:      Effort: Pulmonary effort is normal.      Breath sounds: Normal breath sounds.   Musculoskeletal:      Right lower leg: No edema.      Left lower leg: No edema.   Skin:     General: Skin is warm.      Findings: No rash.   Neurological:      Mental Status: She is alert.   Psychiatric:         Mood and Affect: Mood normal.         Behavior: Behavior normal.       Administrative Statements           "

## 2024-07-29 ENCOUNTER — HOSPITAL ENCOUNTER (EMERGENCY)
Facility: HOSPITAL | Age: 49
Discharge: HOME | End: 2024-07-29
Attending: EMERGENCY MEDICINE
Payer: COMMERCIAL

## 2024-07-29 VITALS
SYSTOLIC BLOOD PRESSURE: 116 MMHG | OXYGEN SATURATION: 96 % | HEART RATE: 91 BPM | TEMPERATURE: 97.7 F | DIASTOLIC BLOOD PRESSURE: 66 MMHG | RESPIRATION RATE: 18 BRPM

## 2024-07-29 DIAGNOSIS — N39.0 UTI (URINARY TRACT INFECTION): Primary | ICD-10-CM

## 2024-07-29 LAB
BACTERIA UR QL AUTO: ABNORMAL /HPF
BILIRUB UR QL STRIP: NEGATIVE
CLARITY UR: CLEAR
COLOR UR: COLORLESS
GLUCOSE UR STRIP-MCNC: NEGATIVE MG/DL
HGB UR QL STRIP.AUTO: ABNORMAL
KETONES UR STRIP-MCNC: NEGATIVE MG/DL
LEUKOCYTE ESTERASE UR QL STRIP: ABNORMAL
NITRITE UR QL STRIP: NEGATIVE
NON-SQ EPI CELLS URNS QL MICRO: ABNORMAL /HPF
PH UR STRIP.AUTO: 5.5 [PH]
PROT UR STRIP-MCNC: NEGATIVE MG/DL
RBC #/AREA URNS AUTO: ABNORMAL /HPF
SP GR UR STRIP.AUTO: 1 (ref 1–1.03)
UROBILINOGEN UR STRIP-ACNC: <2 MG/DL
WBC #/AREA URNS AUTO: ABNORMAL /HPF

## 2024-07-29 PROCEDURE — 99284 EMERGENCY DEPT VISIT MOD MDM: CPT | Performed by: EMERGENCY MEDICINE

## 2024-07-29 PROCEDURE — 99283 EMERGENCY DEPT VISIT LOW MDM: CPT

## 2024-07-29 PROCEDURE — 81001 URINALYSIS AUTO W/SCOPE: CPT | Performed by: EMERGENCY MEDICINE

## 2024-07-29 RX ORDER — CIPROFLOXACIN 500 MG/1
500 TABLET, FILM COATED ORAL ONCE
Status: COMPLETED | OUTPATIENT
Start: 2024-07-29 | End: 2024-07-29

## 2024-07-29 RX ORDER — CIPROFLOXACIN 500 MG/1
500 TABLET, FILM COATED ORAL 2 TIMES DAILY
Qty: 14 TABLET | Refills: 0 | Status: SHIPPED | OUTPATIENT
Start: 2024-07-29 | End: 2024-08-05

## 2024-07-29 RX ADMIN — CIPROFLOXACIN 500 MG: 500 TABLET ORAL at 22:51

## 2024-07-30 NOTE — ED PROVIDER NOTES
History  Chief Complaint   Patient presents with    Possible UTI     Pt c/o possible UTI, burning with urination and having streaks of blood when wiping, pt also states going through perimenopause. Pt also c/o lower back pain.      Abena Friedman is a 49 y.o.  year old female  Past Medical History:  No date: Arthritis  No date: Nikole-menopause  Social History    Tobacco Use      Smoking status: Never        Passive exposure: Never      Smokeless tobacco: Never    Vaping Use      Vaping status: Never Used    Alcohol use: Yes      Comment: socially    Drug use: Never    Patient presents with:  Possible UTI: Pt c/o possible UTI, burning with urination and having streaks of blood when wiping, pt also states going through perimenopause. Pt also c/o lower back pain.     Woke up with lower back pain  Then had a little bit of blood in the urine, subsequently had santa colored urine    Prior hx of anemia    History obtained directly from the PATIENT              History provided by:  Patient   used: No        Prior to Admission Medications   Prescriptions Last Dose Informant Patient Reported? Taking?   Cholecalciferol (Vitamin D3) 50 MCG (2000 UT) capsule   No No   Sig: Take 1 capsule (2,000 Units total) by mouth daily Take with Vitamin K2   Diclofenac Sodium (VOLTAREN) 1 %   No No   Sig: Apply 2 g topically 4 (four) times a day   Menaquinone-7 (Vitamin K2) 100 MCG CAPS   No No   Sig: Take 2 capsules (200 mcg total) by mouth daily with lunch Take with Vitamin D3   SUMAtriptan (IMITREX) 50 mg tablet   No No   Sig: Take 1 tablet (50 mg total) by mouth once as needed for migraine may repeat in 2 hours if necessary   Zepbound 5 MG/0.5ML auto-injector   Yes No   Patient not taking: Reported on 5/29/2024   acetaminophen (TYLENOL) 650 mg CR tablet   No No   Sig: Take 1 tablet (650 mg total) by mouth every 8 (eight) hours as needed for mild pain   ergocalciferol (VITAMIN D2) 50,000 units   No No   Sig: Take 1  capsule (50,000 Units total) by mouth 2 (two) times a week with meals   levocetirizine (XYZAL) 5 MG tablet   No No   Sig: Take 1 tablet (5 mg total) by mouth every evening   nystatin (MYCOSTATIN) powder   No No   Sig: Apply topically 3 (three) times a day   pantoprazole (PROTONIX) 40 mg tablet   No No   Sig: Take 1 tablet (40 mg total) by mouth daily      Facility-Administered Medications: None       Past Medical History:   Diagnosis Date    Arthritis     Nikole-menopause        Past Surgical History:   Procedure Laterality Date     SECTION      GASTRIC BYPASS  2017       Family History   Problem Relation Age of Onset    Hypertension Mother     Diabetes Mother     Glaucoma Father     Dementia Father      I have reviewed and agree with the history as documented.    E-Cigarette/Vaping    E-Cigarette Use Never User      E-Cigarette/Vaping Substances    Nicotine No     THC No     CBD No     Flavoring No     Other No     Unknown No      Social History     Tobacco Use    Smoking status: Never     Passive exposure: Never    Smokeless tobacco: Never   Vaping Use    Vaping status: Never Used   Substance Use Topics    Alcohol use: Yes     Comment: socially    Drug use: Never       Review of Systems   Constitutional:  Positive for chills and fatigue. Negative for fever.   HENT:  Negative for ear pain and sore throat.    Eyes:  Negative for pain and visual disturbance.   Respiratory:  Negative for cough and shortness of breath.    Cardiovascular:  Negative for chest pain and palpitations.   Gastrointestinal:  Negative for abdominal pain and vomiting.   Genitourinary:  Positive for hematuria. Negative for dysuria.   Musculoskeletal:  Negative for arthralgias and back pain.   Skin:  Negative for color change and rash.   Neurological:  Negative for seizures and syncope.   All other systems reviewed and are negative.      Physical Exam  Physical Exam  Vitals and nursing note reviewed.   Constitutional:       General: She  is not in acute distress.     Appearance: Normal appearance. She is well-developed and normal weight.   HENT:      Head: Normocephalic and atraumatic.   Eyes:      Conjunctiva/sclera: Conjunctivae normal.   Cardiovascular:      Rate and Rhythm: Normal rate.      Heart sounds: No murmur heard.  Pulmonary:      Effort: Pulmonary effort is normal. No respiratory distress.   Abdominal:      Palpations: Abdomen is soft.      Tenderness: There is no abdominal tenderness.   Musculoskeletal:         General: No swelling.      Cervical back: Neck supple.   Skin:     General: Skin is warm and dry.      Capillary Refill: Capillary refill takes less than 2 seconds.   Neurological:      General: No focal deficit present.      Mental Status: She is alert and oriented to person, place, and time.   Psychiatric:         Mood and Affect: Mood normal.         Thought Content: Thought content normal.         Vital Signs  ED Triage Vitals [07/29/24 2227]   Temperature Pulse Respirations Blood Pressure SpO2   97.7 °F (36.5 °C) 91 18 116/66 96 %      Temp Source Heart Rate Source Patient Position - Orthostatic VS BP Location FiO2 (%)   Temporal Monitor Sitting Left arm --      Pain Score       --           Vitals:    07/29/24 2227   BP: 116/66   Pulse: 91   Patient Position - Orthostatic VS: Sitting         Visual Acuity      ED Medications  Medications   ciprofloxacin (CIPRO) tablet 500 mg (500 mg Oral Given 7/29/24 2251)       Diagnostic Studies  Results Reviewed       Procedure Component Value Units Date/Time    UA w Reflex to Microscopic w Reflex to Culture [[id number]]  (Abnormal) Collected: 07/29/24 2254    Lab Status: Final result Specimen: Urine, Clean Catch Updated: 07/29/24 2309     Color, UA Colorless     Clarity, UA Clear     Specific Gravity, UA 1.005     pH, UA 5.5     Leukocytes, UA Small     Nitrite, UA Negative     Protein, UA Negative mg/dl      Glucose, UA Negative mg/dl      Ketones, UA Negative mg/dl      Urobilinogen,  UA <2.0 mg/dl      Bilirubin, UA Negative     Occult Blood, UA Large    Urine Microscopic [[id number]] Collected: 07/29/24 2254    Lab Status: In process Specimen: Urine, Clean Catch Updated: 07/29/24 2309                   No orders to display              Procedures  Procedures         ED Course  ED Course as of 07/29/24 2312 Mon Jul 29, 2024 2312 Leukocytes, UA(!): Small   2312 Blood, UA(!): Large                                 SBIRT 22yo+      Flowsheet Row Most Recent Value   Initial Alcohol Screen: US AUDIT-C     1. How often do you have a drink containing alcohol? 0 Filed at: 07/29/2024 2301   2. How many drinks containing alcohol do you have on a typical day you are drinking?  0 Filed at: 07/29/2024 2301   3a. Male UNDER 65: How often do you have five or more drinks on one occasion? 0 Filed at: 07/29/2024 2301   3b. FEMALE Any Age, or MALE 65+: How often do you have 4 or more drinks on one occassion? 0 Filed at: 07/29/2024 2301   Audit-C Score 0 Filed at: 07/29/2024 2301   ROSALINA: How many times in the past year have you...    Used an illegal drug or used a prescription medication for non-medical reasons? Never Filed at: 07/29/2024 2301                      Medical Decision Making  Amount and/or Complexity of Data Reviewed  Discussion of management or test interpretation with external provider(s): Patient has presented to the Emergency Department with exacerbation of chronic condition / pt with new illness-injury that may poses a threat to life or body function.  At least 3 different tests have been ordered on this patient AND reviewed the results.   Old laboratory data (of at least 2 tests) were reviewed from the medical records and compared to today's results  Discussion with patient and/or family members of results (normal and abnormal) and the implications for immediate and long term treatment/management.  Due to the high risk of morbidity further diagnostic testing and treatment is necessary.    10:42  PM  I discussed the case with the hospitalist. We reviewed the HPI, pertinent PMH, ED course and management.   Hospitalist agreed with plan and will admit the patient to the hospital.    Medications  ciprofloxacin (CIPRO) tablet 500 mg (has no administration in time range)            Risk  Prescription drug management.                 Disposition  Final diagnoses:   UTI (urinary tract infection)     Time reflects when diagnosis was documented in both MDM as applicable and the Disposition within this note       Time User Action Codes Description Comment    7/29/2024 10:42 PM Benson Howard [N39.0] UTI (urinary tract infection)           ED Disposition       ED Disposition   Discharge    Condition   Stable    Date/Time   Mon Jul 29, 2024 2242    Comment   Abena Friedman discharge to home/self care.                   Follow-up Information       Follow up With Specialties Details Why Contact Info    Marvin Cabezas MD Family Medicine In 3 days If symptoms worsen [id number]  [facility] [id number] [phone]              Current Discharge Medication List        START taking these medications    Details   ciprofloxacin (CIPRO) 500 mg tablet Take 1 tablet (500 mg total) by mouth 2 (two) times a day for 7 days  Qty: 14 tablet, Refills: 0    Associated Diagnoses: UTI (urinary tract infection)           CONTINUE these medications which have NOT CHANGED    Details   acetaminophen (TYLENOL) 650 mg CR tablet Take 1 tablet (650 mg total) by mouth every 8 (eight) hours as needed for mild pain  Qty: 30 tablet, Refills: 0    Associated Diagnoses: Acute pain of right knee      Cholecalciferol (Vitamin D3) 50 MCG (2000 UT) capsule Take 1 capsule (2,000 Units total) by mouth daily Take with Vitamin K2  Qty: 90 capsule, Refills: 3    Associated Diagnoses: Vitamin D insufficiency; Healthcare maintenance      Diclofenac Sodium (VOLTAREN) 1 % Apply 2 g topically 4 (four) times a day  Qty: 50 g, Refills: 0     Associated Diagnoses: Acute pain of right knee      ergocalciferol (VITAMIN D2) 50,000 units Take 1 capsule (50,000 Units total) by mouth 2 (two) times a week with meals  Qty: 24 capsule, Refills: 0    Associated Diagnoses: Postsurgical malabsorption; Vitamin D deficiency      levocetirizine (XYZAL) 5 MG tablet Take 1 tablet (5 mg total) by mouth every evening  Qty: 30 tablet, Refills: 1    Associated Diagnoses: Asthma due to environmental allergies      Menaquinone-7 (Vitamin K2) 100 MCG CAPS Take 2 capsules (200 mcg total) by mouth daily with lunch Take with Vitamin D3  Qty: 180 capsule, Refills: 3    Associated Diagnoses: Encounter for herb and vitamin supplement management; Healthcare maintenance      nystatin (MYCOSTATIN) powder Apply topically 3 (three) times a day  Qty: 60 g, Refills: 0    Associated Diagnoses: Yeast infection      pantoprazole (PROTONIX) 40 mg tablet Take 1 tablet (40 mg total) by mouth daily  Qty: 30 tablet, Refills: 0    Associated Diagnoses: Dyspepsia      SUMAtriptan (IMITREX) 50 mg tablet Take 1 tablet (50 mg total) by mouth once as needed for migraine may repeat in 2 hours if necessary  Qty: 10 tablet, Refills: 0    Associated Diagnoses: Generalized headaches      Zepbound 5 MG/0.5ML auto-injector              No discharge procedures on file.    PDMP Review       None            ED Provider  Electronically Signed by             Benson Howard MD  07/29/24 0038

## 2024-07-30 NOTE — DISCHARGE INSTRUCTIONS
A  personal message from Dr. Benson Howard,  Thank you so much for allowing me to care for you today.    I pride myself in the care and attention I give all my patients.  I hope you were a witness to this tonight.   If for any reason your condition does not improve or worsens, or you have a question that was not answered during your visit you can feel free to text me on my personal phone #  # [phone].   I will answer to your message and continue your care past your emergency room visit.     Please understand that although you are being discharged because your condition has been deemed stable and able to be managed on an outpatient setting. However your condition may worsen as part of the natural progression of the illness/condition, if this occurs please come back to the emergency department for a repeat evaluation.

## 2024-08-05 ENCOUNTER — APPOINTMENT (EMERGENCY)
Dept: CT IMAGING | Facility: HOSPITAL | Age: 49
End: 2024-08-05
Payer: COMMERCIAL

## 2024-08-05 ENCOUNTER — HOSPITAL ENCOUNTER (EMERGENCY)
Facility: HOSPITAL | Age: 49
Discharge: HOME | End: 2024-08-05
Attending: EMERGENCY MEDICINE | Admitting: EMERGENCY MEDICINE
Payer: COMMERCIAL

## 2024-08-05 VITALS
DIASTOLIC BLOOD PRESSURE: 64 MMHG | SYSTOLIC BLOOD PRESSURE: 107 MMHG | TEMPERATURE: 98.3 F | OXYGEN SATURATION: 100 % | RESPIRATION RATE: 16 BRPM | HEART RATE: 69 BPM

## 2024-08-05 DIAGNOSIS — N95.9 PREMENOPAUSAL PATIENT: ICD-10-CM

## 2024-08-05 DIAGNOSIS — K59.00 CONSTIPATION: Primary | ICD-10-CM

## 2024-08-05 DIAGNOSIS — Z87.448 HISTORY OF HEMATURIA: ICD-10-CM

## 2024-08-05 DIAGNOSIS — K57.90 DIVERTICULOSIS: ICD-10-CM

## 2024-08-05 DIAGNOSIS — D73.4 SPLENIC CYST: ICD-10-CM

## 2024-08-05 DIAGNOSIS — R10.814 LLQ ABDOMINAL TENDERNESS: ICD-10-CM

## 2024-08-05 DIAGNOSIS — K76.9 LIVER LESION: ICD-10-CM

## 2024-08-05 LAB
ANION GAP SERPL CALCULATED.3IONS-SCNC: 6 MMOL/L (ref 4–13)
BASOPHILS # BLD AUTO: 0.07 THOUSANDS/ÂΜL (ref 0–0.1)
BASOPHILS NFR BLD AUTO: 1 % (ref 0–1)
BILIRUB UR QL STRIP: NEGATIVE
BUN SERPL-MCNC: 9 MG/DL (ref 5–25)
CALCIUM SERPL-MCNC: 9 MG/DL (ref 8.4–10.2)
CHLORIDE SERPL-SCNC: 102 MMOL/L (ref 96–108)
CLARITY UR: CLEAR
CO2 SERPL-SCNC: 28 MMOL/L (ref 21–32)
COLOR UR: NORMAL
CREAT SERPL-MCNC: 0.61 MG/DL (ref 0.6–1.3)
EOSINOPHIL # BLD AUTO: 0.31 THOUSAND/ÂΜL (ref 0–0.61)
EOSINOPHIL NFR BLD AUTO: 5 % (ref 0–6)
ERYTHROCYTE [DISTWIDTH] IN BLOOD BY AUTOMATED COUNT: 13.9 % (ref 11.6–15.1)
EXT PREGNANCY TEST URINE: NEGATIVE
EXT. CONTROL: NORMAL
GFR SERPL CREATININE-BSD FRML MDRD: 106 ML/MIN/1.73SQ M
GLUCOSE SERPL-MCNC: 82 MG/DL (ref 65–140)
GLUCOSE UR STRIP-MCNC: NEGATIVE MG/DL
HCT VFR BLD AUTO: 40.7 % (ref 34.8–46.1)
HGB BLD-MCNC: 13.6 G/DL (ref 11.5–15.4)
HGB UR QL STRIP.AUTO: NEGATIVE
IMM GRANULOCYTES # BLD AUTO: 0.01 THOUSAND/UL (ref 0–0.2)
IMM GRANULOCYTES NFR BLD AUTO: 0 % (ref 0–2)
KETONES UR STRIP-MCNC: NEGATIVE MG/DL
LEUKOCYTE ESTERASE UR QL STRIP: NEGATIVE
LYMPHOCYTES # BLD AUTO: 2.59 THOUSANDS/ÂΜL (ref 0.6–4.47)
LYMPHOCYTES NFR BLD AUTO: 37 % (ref 14–44)
MCH RBC QN AUTO: 27.9 PG (ref 26.8–34.3)
MCHC RBC AUTO-ENTMCNC: 33.4 G/DL (ref 31.4–37.4)
MCV RBC AUTO: 83 FL (ref 82–98)
MONOCYTES # BLD AUTO: 0.49 THOUSAND/ÂΜL (ref 0.17–1.22)
MONOCYTES NFR BLD AUTO: 7 % (ref 4–12)
NEUTROPHILS # BLD AUTO: 3.49 THOUSANDS/ÂΜL (ref 1.85–7.62)
NEUTS SEG NFR BLD AUTO: 50 % (ref 43–75)
NITRITE UR QL STRIP: NEGATIVE
NRBC BLD AUTO-RTO: 0 /100 WBCS
PH UR STRIP.AUTO: 5.5 [PH]
PLATELET # BLD AUTO: 345 THOUSANDS/UL (ref 149–390)
PMV BLD AUTO: 10.5 FL (ref 8.9–12.7)
POTASSIUM SERPL-SCNC: 4 MMOL/L (ref 3.5–5.3)
PROT UR STRIP-MCNC: NEGATIVE MG/DL
RBC # BLD AUTO: 4.88 MILLION/UL (ref 3.81–5.12)
SODIUM SERPL-SCNC: 136 MMOL/L (ref 135–147)
SP GR UR STRIP.AUTO: 1.01 (ref 1–1.03)
UROBILINOGEN UR STRIP-ACNC: <2 MG/DL
WBC # BLD AUTO: 6.96 THOUSAND/UL (ref 4.31–10.16)

## 2024-08-05 PROCEDURE — 81003 URINALYSIS AUTO W/O SCOPE: CPT

## 2024-08-05 PROCEDURE — 99284 EMERGENCY DEPT VISIT MOD MDM: CPT

## 2024-08-05 PROCEDURE — 85025 COMPLETE CBC W/AUTO DIFF WBC: CPT

## 2024-08-05 PROCEDURE — 74176 CT ABD & PELVIS W/O CONTRAST: CPT

## 2024-08-05 PROCEDURE — 96365 THER/PROPH/DIAG IV INF INIT: CPT

## 2024-08-05 PROCEDURE — 96366 THER/PROPH/DIAG IV INF ADDON: CPT

## 2024-08-05 PROCEDURE — 96375 TX/PRO/DX INJ NEW DRUG ADDON: CPT

## 2024-08-05 PROCEDURE — 80048 BASIC METABOLIC PNL TOTAL CA: CPT

## 2024-08-05 PROCEDURE — 36415 COLL VENOUS BLD VENIPUNCTURE: CPT

## 2024-08-05 PROCEDURE — 81025 URINE PREGNANCY TEST: CPT

## 2024-08-05 PROCEDURE — 87086 URINE CULTURE/COLONY COUNT: CPT

## 2024-08-05 RX ORDER — PANTOPRAZOLE SODIUM 40 MG/10ML
40 INJECTION, POWDER, LYOPHILIZED, FOR SOLUTION INTRAVENOUS ONCE
Status: COMPLETED | OUTPATIENT
Start: 2024-08-05 | End: 2024-08-05

## 2024-08-05 RX ORDER — KETOROLAC TROMETHAMINE 30 MG/ML
15 INJECTION, SOLUTION INTRAMUSCULAR; INTRAVENOUS ONCE
Status: COMPLETED | OUTPATIENT
Start: 2024-08-05 | End: 2024-08-05

## 2024-08-05 RX ORDER — ONDANSETRON 2 MG/ML
4 INJECTION INTRAMUSCULAR; INTRAVENOUS ONCE
Status: DISCONTINUED | OUTPATIENT
Start: 2024-08-05 | End: 2024-08-05 | Stop reason: HOSPADM

## 2024-08-05 RX ORDER — POLYETHYLENE GLYCOL 3350 17 G/17G
17 POWDER, FOR SOLUTION ORAL DAILY
Qty: 119 G | Refills: 0 | Status: SHIPPED | OUTPATIENT
Start: 2024-08-05 | End: 2024-08-12

## 2024-08-05 RX ORDER — ACETAMINOPHEN 10 MG/ML
1000 INJECTION, SOLUTION INTRAVENOUS ONCE
Status: DISCONTINUED | OUTPATIENT
Start: 2024-08-05 | End: 2024-08-05

## 2024-08-05 RX ORDER — SODIUM CHLORIDE, SODIUM GLUCONATE, SODIUM ACETATE, POTASSIUM CHLORIDE, MAGNESIUM CHLORIDE, SODIUM PHOSPHATE, DIBASIC, AND POTASSIUM PHOSPHATE .53; .5; .37; .037; .03; .012; .00082 G/100ML; G/100ML; G/100ML; G/100ML; G/100ML; G/100ML; G/100ML
1000 INJECTION, SOLUTION INTRAVENOUS ONCE
Status: COMPLETED | OUTPATIENT
Start: 2024-08-05 | End: 2024-08-05

## 2024-08-05 RX ADMIN — SODIUM CHLORIDE, SODIUM GLUCONATE, SODIUM ACETATE, POTASSIUM CHLORIDE, MAGNESIUM CHLORIDE, SODIUM PHOSPHATE, DIBASIC, AND POTASSIUM PHOSPHATE 1000 ML: .53; .5; .37; .037; .03; .012; .00082 INJECTION, SOLUTION INTRAVENOUS at 09:21

## 2024-08-05 RX ADMIN — KETOROLAC TROMETHAMINE 15 MG: 30 INJECTION, SOLUTION INTRAMUSCULAR at 09:21

## 2024-08-05 RX ADMIN — PANTOPRAZOLE SODIUM 40 MG: 40 INJECTION, POWDER, FOR SOLUTION INTRAVENOUS at 09:21

## 2024-08-05 NOTE — ED PROVIDER NOTES
History  Chief Complaint   Patient presents with    Flank Pain     Pt reports being seen here last Tuesday and treated for UTI; currently on Abx to treat but now exp left flank pain for the past 3 days. UTI symptoms remain unchanged as well. +nausea     Abena is a 49-year-old perimenopausal female with past medical history of gastric bypass surgery 7 years ago, and chronic iron deficiency anemia, presenting to the ED today with complaint of left flank pain x 3 days.  Patient states the left flank pain began 3 days ago and has since traveled down to her left lower quadrant.  Patient recently presented to this ED with complaint of dysuria and urinary urgency, as well as hematuria.  Patient was treated for urinary tract infection with ciprofloxacin, which she has been taking consistently as prescribed.  Patient states since the hematuria has resolved, but she has persisting urinary urgency as well as developing left flank to left lower quadrant pain.  Patient reports associated nausea and nonbloody vomiting, as well as worsening generalized fatigue.  Patient denies fever/chills, chest pain, shortness of breath, bloody stool, diarrhea/constipation.  Patient reports being perimenopausal, LMP 2 weeks ago, which was 1 to 2 days spotting.  Last gynecologic visit was 1 year ago, and patient has no history of any gynecologic problems. Surgical history notable for John-en-Y 7 years ago, after which she has experienced complication of chronic iron deficiency anemia.          Prior to Admission Medications   Prescriptions Last Dose Informant Patient Reported? Taking?   Cholecalciferol (Vitamin D3) 50 MCG (2000 UT) capsule   No No   Sig: Take 1 capsule (2,000 Units total) by mouth daily Take with Vitamin K2   Diclofenac Sodium (VOLTAREN) 1 %   No No   Sig: Apply 2 g topically 4 (four) times a day   Menaquinone-7 (Vitamin K2) 100 MCG CAPS   No No   Sig: Take 2 capsules (200 mcg total) by mouth daily with lunch Take with Vitamin  D3   SUMAtriptan (IMITREX) 50 mg tablet   No No   Sig: Take 1 tablet (50 mg total) by mouth once as needed for migraine may repeat in 2 hours if necessary   Zepbound 5 MG/0.5ML auto-injector   Yes No   Patient not taking: Reported on 2024   acetaminophen (TYLENOL) 650 mg CR tablet   No No   Sig: Take 1 tablet (650 mg total) by mouth every 8 (eight) hours as needed for mild pain   ciprofloxacin (CIPRO) 500 mg tablet   No No   Sig: Take 1 tablet (500 mg total) by mouth 2 (two) times a day for 7 days   ergocalciferol (VITAMIN D2) 50,000 units   No No   Sig: Take 1 capsule (50,000 Units total) by mouth 2 (two) times a week with meals   levocetirizine (XYZAL) 5 MG tablet   No No   Sig: Take 1 tablet (5 mg total) by mouth every evening   nystatin (MYCOSTATIN) powder   No No   Sig: Apply topically 3 (three) times a day   pantoprazole (PROTONIX) 40 mg tablet   No No   Sig: Take 1 tablet (40 mg total) by mouth daily      Facility-Administered Medications: None       Past Medical History:   Diagnosis Date    Arthritis     Nikole-menopause        Past Surgical History:   Procedure Laterality Date     SECTION  2010    GASTRIC BYPASS  2017       Family History   Problem Relation Age of Onset    Hypertension Mother     Diabetes Mother     Glaucoma Father     Dementia Father      I have reviewed and agree with the history as documented.    E-Cigarette/Vaping    E-Cigarette Use Never User      E-Cigarette/Vaping Substances    Nicotine No     THC No     CBD No     Flavoring No     Other No     Unknown No      Social History     Tobacco Use    Smoking status: Never     Passive exposure: Never    Smokeless tobacco: Never   Vaping Use    Vaping status: Never Used   Substance Use Topics    Alcohol use: Yes     Comment: socially    Drug use: Never       Review of Systems   Constitutional:  Positive for fatigue. Negative for chills, diaphoresis and fever.   Respiratory:  Negative for shortness of breath.    Cardiovascular:   Negative for chest pain and palpitations.   Gastrointestinal:  Positive for abdominal pain, nausea and vomiting. Negative for blood in stool, constipation and diarrhea.   Genitourinary:  Positive for dysuria, flank pain and urgency. Negative for decreased urine volume and frequency.   Musculoskeletal:  Positive for back pain (left). Negative for neck pain.   Skin:  Negative for pallor, rash and wound.   Neurological:  Negative for dizziness, syncope, light-headedness and headaches.       Physical Exam  Physical Exam  Constitutional:       General: She is not in acute distress.     Appearance: Normal appearance. She is normal weight. She is not ill-appearing, toxic-appearing or diaphoretic.   Cardiovascular:      Rate and Rhythm: Normal rate and regular rhythm.      Pulses: Normal pulses.      Heart sounds: Normal heart sounds. No murmur heard.  Pulmonary:      Effort: Pulmonary effort is normal. No respiratory distress.      Breath sounds: Normal breath sounds. No wheezing, rhonchi or rales.   Abdominal:      Palpations: Abdomen is soft.      Tenderness: There is abdominal tenderness (LLQ) in the left lower quadrant. There is no right CVA tenderness, left CVA tenderness, guarding or rebound. Negative signs include Jenkins's sign, Rovsing's sign and McBurney's sign.      Comments: Hypoactive BS   Musculoskeletal:         General: No tenderness.      Right lower leg: No edema.      Left lower leg: No edema.   Skin:     General: Skin is warm and dry.      Capillary Refill: Capillary refill takes less than 2 seconds.      Coloration: Skin is not jaundiced or pale.      Findings: No bruising, erythema, lesion or rash.   Neurological:      Mental Status: She is alert and oriented to person, place, and time.         Vital Signs  ED Triage Vitals   Temperature Pulse Respirations Blood Pressure SpO2   08/05/24 0809 08/05/24 0809 08/05/24 0809 08/05/24 0809 08/05/24 0809   98.3 °F (36.8 °C) 68 16 123/64 100 %      Temp Source  Heart Rate Source Patient Position - Orthostatic VS BP Location FiO2 (%)   08/05/24 0809 08/05/24 0809 08/05/24 0809 08/05/24 0809 --   Temporal Monitor Sitting Left arm       Pain Score       08/05/24 0921       5           Vitals:    08/05/24 0809 08/05/24 1012   BP: 123/64 107/64   Pulse: 68 69   Patient Position - Orthostatic VS: Sitting Lying         Visual Acuity      ED Medications  Medications   ondansetron (ZOFRAN) injection 4 mg (0 mg Intravenous Hold 8/5/24 0922)   pantoprazole (PROTONIX) injection 40 mg (40 mg Intravenous Given 8/5/24 0921)   ketorolac (TORADOL) injection 15 mg (15 mg Intravenous Given 8/5/24 0921)   multi-electrolyte (ISOLYTE-S PH 7.4) bolus 1,000 mL (1,000 mL Intravenous New Bag 8/5/24 0921)       Diagnostic Studies  Results Reviewed       Procedure Component Value Units Date/Time    UA (URINE) with reflex to Scope [[id number]] Collected: 08/05/24 1014    Lab Status: Final result Specimen: Urine, Clean Catch Updated: 08/05/24 1041     Color, UA Light Yellow     Clarity, UA Clear     Specific Gravity, UA 1.009     pH, UA 5.5     Leukocytes, UA Negative     Nitrite, UA Negative     Protein, UA Negative mg/dl      Glucose, UA Negative mg/dl      Ketones, UA Negative mg/dl      Urobilinogen, UA <2.0 mg/dl      Bilirubin, UA Negative     Occult Blood, UA Negative    Urine culture [[id number]] Collected: 08/05/24 1014    Lab Status: In process Specimen: Urine, Clean Catch Updated: 08/05/24 1017    POCT pregnancy, urine [[id number]]  (Normal) Resulted: 08/05/24 1015    Lab Status: Final result Updated: 08/05/24 1015     EXT Preg Test, Ur Negative     Control Valid    Basic metabolic panel [[id number]] Collected: 08/05/24 0916    Lab Status: Final result Specimen: Blood from Arm, Right Updated: 08/05/24 0943     Sodium 136 mmol/L      Potassium 4.0 mmol/L      Chloride 102 mmol/L      CO2 28 mmol/L      ANION GAP 6 mmol/L      BUN 9 mg/dL      Creatinine 0.61 mg/dL      Glucose 82 mg/dL       Calcium 9.0 mg/dL      eGFR 106 ml/min/1.73sq m     Narrative:      National Kidney Disease Foundation guidelines for Chronic Kidney Disease (CKD):     Stage 1 with normal or high GFR (GFR > 90 mL/min/1.73 square meters)    Stage 2 Mild CKD (GFR = 60-89 mL/min/1.73 square meters)    Stage 3A Moderate CKD (GFR = 45-59 mL/min/1.73 square meters)    Stage 3B Moderate CKD (GFR = 30-44 mL/min/1.73 square meters)    Stage 4 Severe CKD (GFR = 15-29 mL/min/1.73 square meters)    Stage 5 End Stage CKD (GFR <15 mL/min/1.73 square meters)  Note: GFR calculation is accurate only with a steady state creatinine    CBC and differential [[id number]] Collected: 08/05/24 0916    Lab Status: Final result Specimen: Blood from Arm, Right Updated: 08/05/24 0925     WBC 6.96 Thousand/uL      RBC 4.88 Million/uL      Hemoglobin 13.6 g/dL      Hematocrit 40.7 %      MCV 83 fL      MCH 27.9 pg      MCHC 33.4 g/dL      RDW 13.9 %      MPV 10.5 fL      Platelets 345 Thousands/uL      nRBC 0 /100 WBCs      Segmented % 50 %      Immature Grans % 0 %      Lymphocytes % 37 %      Monocytes % 7 %      Eosinophils Relative 5 %      Basophils Relative 1 %      Absolute Neutrophils 3.49 Thousands/µL      Absolute Immature Grans 0.01 Thousand/uL      Absolute Lymphocytes 2.59 Thousands/µL      Absolute Monocytes 0.49 Thousand/µL      Eosinophils Absolute 0.31 Thousand/µL      Basophils Absolute 0.07 Thousands/µL                    CT abdomen pelvis wo contrast   Final Result by Verónica Nguyen MD (08/05 1138)      No acute findings in the abdomen or pelvis within the limits of unenhanced study. Specifically, no urinary tract calculi or evidence of obstructive uropathy.               Resident: Brent Garcia I, the attending radiologist, have reviewed the images and agree with the final report above.      Workstation performed: [id number]                    SBIRT 22yo+      Flowsheet Row Most Recent Value   Initial Alcohol Screen: US  "AUDIT-C     1. How often do you have a drink containing alcohol? 0 Filed at: 08/05/2024 1015   2. How many drinks containing alcohol do you have on a typical day you are drinking?  0 Filed at: 08/05/2024 1015   3a. Male UNDER 65: How often do you have five or more drinks on one occasion? 0 Filed at: 08/05/2024 1015   3b. FEMALE Any Age, or MALE 65+: How often do you have 4 or more drinks on one occassion? 0 Filed at: 08/05/2024 1015   Audit-C Score 0 Filed at: 08/05/2024 1015   ROSALINA: How many times in the past year have you...    Used an illegal drug or used a prescription medication for non-medical reasons? Never Filed at: 08/05/2024 1015            Medical Decision Making  INITIAL EVALUATION: Patient presented to the ED independently,  at bedside, with complaint of left flank and left lower quadrant pain.  Vital signs within normal limits.  Patient well-appearing, in no apparent distress.  Patient was in the ED on 7/28 with complaint of hematuria, UA revealing of RBCs, and prescribed ciprofloxacin for suspected UTI.  Patient reports taking this medicine as prescribed, and hematuria resolving, but developing left flank pain 3 days ago which progressed to left lower quadrant pain.  Patient has additional complaints of \"bad acid reflux\" associated with nausea and nonbloody vomiting.  Patient has been taking Tylenol, which has not been helping.  Physical exam notable for mild left lower quadrant pain and absent CVA tenderness.  Initial impression kidney stone, differential diagnosis including pregnancy, ovarian cyst.  Will initiate workup to include CT abdomen pelvis without contrast, CBC BMP lipase, UA.  Will provide Zofran, IV fluids, Protonix and Toradol for symptomatic relief.  Will continue to evaluate patient following treatment.    FINDINGS: Lab work and UA results all within normal limits.  CT revealing of several abnormalities: High stool burden, diverticulosis without evidence of diverticulitis, " splenic cyst, liver cyst.  Patient reports symptoms improved following treatments provided.    SUMMARY:  Patient hemodynamically stable in the ED.  Patient amenable to discharge home with follow-up with PCP and GYN.  Patient encouraged to take Tylenol as needed for pain relief.  Patient also encouraged to take MiraLAX daily to regulate bowel movements.  Patient educated about reasons return to the emergency room.  All questions answered to the satisfaction of the patient.  Patient discharged home in stable condition.    Amount and/or Complexity of Data Reviewed  Labs: ordered.  Radiology: ordered.    Risk  Prescription drug management.                 Disposition  Final diagnoses:   Constipation   History of hematuria   Premenopausal patient   Diverticulosis   Splenic cyst   Liver lesion   LLQ abdominal tenderness     Time reflects when diagnosis was documented in both MDM as applicable and the Disposition within this note       Time User Action Codes Description Comment    8/5/2024 12:09 PM Pamela Huitron Add [K59.00] Constipation     8/5/2024 12:09 PM Pamela Huitron Add [Z87.448] History of hematuria     8/5/2024 12:09 PM Pamela Huitron Add [N95.9] Premenopausal patient     8/5/2024 12:10 PM Pamela Huitron Add [K57.90] Diverticulosis     8/5/2024 12:10 PM Pamela Huitron Add [D73.4] Splenic cyst     8/5/2024 12:11 PM Pamela Huitron Add [K76.9] Liver lesion     8/5/2024 12:12 PM Pamela Huitron Add [R10.814] LLQ abdominal tenderness           ED Disposition       ED Disposition   Discharge    Condition   Stable    Date/Time   Mon Aug 5, 2024 1206    Comment   Abena Friedman discharge to home/self care.                   Follow-up Information       Follow up With Specialties Details Why Contact Info Additional Information    Marvin Cabezas MD Family Medicine Schedule an appointment as soon as possible for a visit   [address]  [address]  [place] [address] [phone]       [facility] Schedule an appointment as soon as possible for a visit   [address]  [facility] [address]  [phone] [facility] [place], [facility] [address], [place], [address]     [phone]            Patient's Medications   Discharge Prescriptions    POLYETHYLENE GLYCOL (MIRALAX) 17 G PACKET    Take 17 g by mouth daily for 7 days       Start Date: 8/5/2024  End Date: 8/12/2024       Order Dose: 17 g       Quantity: 119 g    Refills: 0           PDMP Review       None            ED Provider  Electronically Signed by             Pamela Huitron PA-C  08/05/24 6058

## 2024-08-05 NOTE — DISCHARGE INSTRUCTIONS
Continue adequate hydration.  Take MiraLAX daily as needed for regular bowel movements.  Use Tylenol as needed for pain control.  Follow-up with your primary care doctor as well as a gynecologist within 1 week as possible.  Return to the ED in the event of worsening abdominal pain, recurrence of severe hematuria, fever/chills in the setting of worsening condition.

## 2024-08-06 LAB — BACTERIA UR CULT: NORMAL

## 2024-08-09 ENCOUNTER — OFFICE VISIT (OUTPATIENT)
Dept: FAMILY MEDICINE CLINIC | Facility: CLINIC | Age: 49
End: 2024-08-09
Payer: COMMERCIAL

## 2024-08-09 VITALS
TEMPERATURE: 98.1 F | RESPIRATION RATE: 16 BRPM | BODY MASS INDEX: 30.69 KG/M2 | HEART RATE: 68 BPM | OXYGEN SATURATION: 98 % | SYSTOLIC BLOOD PRESSURE: 108 MMHG | WEIGHT: 184.2 LBS | DIASTOLIC BLOOD PRESSURE: 82 MMHG | HEIGHT: 65 IN

## 2024-08-09 DIAGNOSIS — K21.9 GASTROESOPHAGEAL REFLUX DISEASE WITHOUT ESOPHAGITIS: Primary | ICD-10-CM

## 2024-08-09 DIAGNOSIS — K59.00 CONSTIPATION, UNSPECIFIED CONSTIPATION TYPE: ICD-10-CM

## 2024-08-09 DIAGNOSIS — L30.4 INTERTRIGO: ICD-10-CM

## 2024-08-09 DIAGNOSIS — N39.0 URINARY TRACT INFECTION WITH HEMATURIA, SITE UNSPECIFIED: ICD-10-CM

## 2024-08-09 DIAGNOSIS — E66.9 OBESITY, CLASS I, BMI 30-34.9: ICD-10-CM

## 2024-08-09 DIAGNOSIS — R31.9 URINARY TRACT INFECTION WITH HEMATURIA, SITE UNSPECIFIED: ICD-10-CM

## 2024-08-09 PROCEDURE — 99214 OFFICE O/P EST MOD 30 MIN: CPT | Performed by: FAMILY MEDICINE

## 2024-08-09 RX ORDER — TIRZEPATIDE 7.5 MG/.5ML
INJECTION, SOLUTION SUBCUTANEOUS
COMMUNITY
Start: 2024-05-14

## 2024-08-09 NOTE — ASSESSMENT & PLAN NOTE
Currently on wegovy due to zepbound shortage  Notes more GERD sx on Wegovy but plans to resume zepbound  Will hold on Gi referral as I feel her GERD is med related

## 2024-08-09 NOTE — PROGRESS NOTES
Ambulatory Visit  Name: Abena Friedman      : 1975      MRN: [id number]  Encounter Provider: Marvin Cabezas MD  Encounter Date: 2024   Encounter department: [facility]    Assessment & Plan   1. Gastroesophageal reflux disease without esophagitis  Assessment & Plan:  Protonix 40 mg ineffective. Has to take 2-6 tabs a day to relieve her symptoms. Informed patient 40 mg BID is the maximum dose and its likely the medication that is causing more GERD symptoms. Suggest sitting up after eating and consuming smaller meal. If symptoms do not improve after switching back to zepbound, will refer to GI   2. Obesity, Class I, BMI 30-34.9  Assessment & Plan:  Currently on wegovy due to zepbound shortage  Notes more GERD sx on Wegovy but plans to resume zepbound  Will hold on Gi referral as I feel her GERD is med related  3. Urinary tract infection with hematuria, site unspecified  Comments:  Seen in the Ed  for UTI symptoms and prescriptions caused psych symptoms. No longer symptomatic therefore no need to treat. Allergy list updated   4. Constipation, unspecified constipation type  Comments:  Likely due to the GLP agonist. Increase fluids and fiber intake  5. Intertrigo  Comments:  Patient has lost signficant weight over the years with various forms of weight managment approaches. As a result, patient has a pannus causing reccurent intertrigo. Nystatin and using paper towels to keep the area dry. Pt plans to persue abdominoplasty and requesting a letter of medical necessity. Letter provided and handed to the patient   e     History of Present Illness     Planning to have a tummy tuck with provider in [place], Dr. Brooke Gramajo ( [phone])  She has dealt with intertrigo in the pannus region for years  Needs a letter of medical necessity  Currently on Wegovy and experiencing more GERD symptoms. Didn't have similar experience with zepbound    Taking 3-6 tabs of protonix 40 mg daily with  "minimal relief       Review of Systems   Gastrointestinal:  Positive for abdominal distention, abdominal pain, constipation and nausea.   Skin:  Positive for rash.     Objective     /82 (BP Location: Left arm, Patient Position: Sitting, Cuff Size: Adult)   Pulse 68   Temp 98.1 °F (36.7 °C) (Tympanic)   Resp 16   Ht 5' 5\" (1.651 m)   Wt 83.6 kg (184 lb 3.2 oz)   SpO2 98%   BMI 30.65 kg/m²     Physical Exam  Constitutional:       General: She is not in acute distress.     Appearance: Normal appearance. She is not ill-appearing or toxic-appearing.   HENT:      Head: Normocephalic and atraumatic.   Abdominal:      General: There is no distension.      Palpations: Abdomen is soft. There is no mass.      Tenderness: There is no abdominal tenderness. There is no right CVA tenderness, left CVA tenderness, guarding or rebound.      Hernia: No hernia is present.   Skin:     Findings: Erythema (mild erythema along the abdominal pannus) present.   Neurological:      Mental Status: She is alert.   Psychiatric:         Mood and Affect: Mood normal.         Behavior: Behavior normal.   Administrative Statements           "

## 2024-08-09 NOTE — LETTER
August 9, 2024     Patient: Abena Friedman  YOB: 1975  Date of Visit: 8/9/2024      To Whom it May Concern:    Abena Friedman is under my professional care. Abena was seen in my office on 8/9/2024.  Has a history of obesity status post bariatric surgery in 2017. She lost significant amount of weight following the surgery but gained a lot of excess tissues. As a result, she now suffers with recurrent intertrigo in the pannus region. She has been treated with topical antifungals and has made lifestyle changes to help reduce the reoccurrence. A abdominoplasty would help to remove the excess skin and resolve this issue.     If you have any questions or concerns, please don't hesitate to call.         Sincerely,          Marvin Cabezas MD        CC: No Recipients

## 2024-10-11 ENCOUNTER — PATIENT MESSAGE (OUTPATIENT)
Dept: FAMILY MEDICINE CLINIC | Facility: CLINIC | Age: 49
End: 2024-10-11

## 2024-10-11 DIAGNOSIS — L30.4 INTERTRIGO: Primary | ICD-10-CM

## 2024-10-11 DIAGNOSIS — E65 ABDOMINAL PANNUS: ICD-10-CM

## 2024-10-28 ENCOUNTER — TELEPHONE (OUTPATIENT)
Dept: PLASTIC SURGERY | Facility: CLINIC | Age: 49
End: 2024-10-28

## 2024-10-28 NOTE — TELEPHONE ENCOUNTER
Had been in contact with patient early of 2024 and had emailed criteria for panniculectomy.  Sent current criteria as patient needed to have several months of Nystatin.

## 2024-10-28 NOTE — TELEPHONE ENCOUNTER
Received call from Patient asking to schedule a Panniculectomy (Referral is in chart).     Sharron ECHEVERRIA: Please contact patient regarding Pann.

## 2024-11-03 ENCOUNTER — OFFICE VISIT (OUTPATIENT)
Age: 49
End: 2024-11-03
Payer: COMMERCIAL

## 2024-11-03 VITALS
SYSTOLIC BLOOD PRESSURE: 100 MMHG | TEMPERATURE: 97.5 F | RESPIRATION RATE: 18 BRPM | HEART RATE: 79 BPM | BODY MASS INDEX: 29.12 KG/M2 | OXYGEN SATURATION: 97 % | WEIGHT: 175 LBS | DIASTOLIC BLOOD PRESSURE: 82 MMHG

## 2024-11-03 DIAGNOSIS — N30.01 ACUTE CYSTITIS WITH HEMATURIA: Primary | ICD-10-CM

## 2024-11-03 DIAGNOSIS — R30.0 DYSURIA: ICD-10-CM

## 2024-11-03 LAB
SL AMB  POCT GLUCOSE, UA: ABNORMAL
SL AMB LEUKOCYTE ESTERASE,UA: ABNORMAL
SL AMB POCT BILIRUBIN,UA: ABNORMAL
SL AMB POCT BLOOD,UA: ABNORMAL
SL AMB POCT CLARITY,UA: ABNORMAL
SL AMB POCT COLOR,UA: ABNORMAL
SL AMB POCT KETONES,UA: ABNORMAL
SL AMB POCT NITRITE,UA: ABNORMAL
SL AMB POCT PH,UA: 5
SL AMB POCT SPECIFIC GRAVITY,UA: 1.01
SL AMB POCT URINE PROTEIN: ABNORMAL
SL AMB POCT UROBILINOGEN: 0.2

## 2024-11-03 PROCEDURE — 81002 URINALYSIS NONAUTO W/O SCOPE: CPT

## 2024-11-03 PROCEDURE — G0382 LEV 3 HOSP TYPE B ED VISIT: HCPCS

## 2024-11-03 PROCEDURE — 87086 URINE CULTURE/COLONY COUNT: CPT

## 2024-11-03 RX ORDER — CEPHALEXIN 500 MG/1
500 CAPSULE ORAL EVERY 12 HOURS SCHEDULED
Qty: 14 CAPSULE | Refills: 0 | Status: SHIPPED | OUTPATIENT
Start: 2024-11-03 | End: 2024-11-10

## 2024-11-03 RX ORDER — TIRZEPATIDE 10 MG/.5ML
INJECTION, SOLUTION SUBCUTANEOUS
COMMUNITY
Start: 2024-10-07

## 2024-11-03 NOTE — PROGRESS NOTES
[facility]        NAME: Abena Friedman is a 49 y.o. female  : 1975    MRN: [id number]  DATE: November 3, 2024  TIME: 12:07 PM    Assessment and Plan   Acute cystitis with hematuria [N30.01]  1. Acute cystitis with hematuria  cephalexin (KEFLEX) 500 mg capsule      2. Dysuria  Urine culture        Urine untestable due to Azo use, will treat based on symptoms and send urine culture. VSS in clinic, appears in no acute distress. Educated on use of OTC products for symptoms. Advised close follow-up with PCP or to report to the ER if symptoms worsen. Patient verbalizes understanding and agreeable to plan.         Patient Instructions     Take antibiotic as prescribed for next 7 days, complete entire course even if feeling better. May also take over-the-counter azo (pyridium) for 2-3 days for bladder spasms. Will follow-up with urine culture results if need to change antibiotic. Follow-up with PCP in 3-5 days if no improvement of symptoms. Report to ER if symptoms worsen.     Chief Complaint     Chief Complaint   Patient presents with    Possible UTI     Symptoms started 2 days ago.C/o pain when and after urinating.          History of Present Illness       49 year old female presents for evaluation of urinary frequency, urgency, and burning with urination x 2 days. She reports h/o recurrent UTI's ever since reaching menopause. She was last treated 3 months ago but advised to stop due to a negative urine culture. She did take the antibiotic (Cipro) again 3 weeks ago because she felt symptoms returned. She did not get evaluated at that time as symptoms resolved until most recently. She denies fevers, urinary odors/discharges, NVD, back pain, abdominal pain, or difficulty passing urine. She denies concern for STD's at this time. She has been taking Azo with minimal improvement of symptoms.     Difficulty Urinating   This is a new problem. The current episode started in the past 7 days. The problem occurs  every urination. The problem has been unchanged. The quality of the pain is described as burning. The pain is moderate. There has been no fever. She is Sexually active. There is No history of pyelonephritis. Associated symptoms include frequency and urgency. Pertinent negatives include no chills, discharge, flank pain, hematuria, hesitancy, nausea, possible pregnancy, sweats or vomiting. She has tried increased fluids (Azo) for the symptoms. The treatment provided mild relief. Her past medical history is significant for recurrent UTIs.       Review of Systems   Review of Systems   Constitutional:  Negative for activity change, appetite change, chills, fatigue and fever.   Respiratory:  Negative for chest tightness.    Cardiovascular:  Negative for chest pain.   Gastrointestinal:  Negative for abdominal pain, constipation, diarrhea, nausea and vomiting.   Genitourinary:  Positive for dysuria, frequency and urgency. Negative for decreased urine volume, difficulty urinating, flank pain, hematuria, hesitancy, vaginal bleeding, vaginal discharge and vaginal pain.   Musculoskeletal:  Negative for arthralgias, back pain, myalgias and neck pain.   Skin:  Negative for color change and pallor.   Allergic/Immunologic: Negative for environmental allergies and food allergies.   Neurological:  Negative for dizziness, light-headedness and headaches.         Current Medications       Current Outpatient Medications:     cephalexin (KEFLEX) 500 mg capsule, Take 1 capsule (500 mg total) by mouth every 12 (twelve) hours for 7 days, Disp: 14 capsule, Rfl: 0    Zepbound 10 MG/0.5ML auto-injector, INJECT 10 MG UNDER THE SKIN ONCE WEEKLY, Disp: , Rfl:     acetaminophen (TYLENOL) 650 mg CR tablet, Take 1 tablet (650 mg total) by mouth every 8 (eight) hours as needed for mild pain, Disp: 30 tablet, Rfl: 0    Cholecalciferol (Vitamin D3) 50 MCG (2000 UT) capsule, Take 1 capsule (2,000 Units total) by mouth daily Take with Vitamin K2, Disp: 90  capsule, Rfl: 3    Diclofenac Sodium (VOLTAREN) 1 %, Apply 2 g topically 4 (four) times a day, Disp: 50 g, Rfl: 0    ergocalciferol (VITAMIN D2) 50,000 units, Take 1 capsule (50,000 Units total) by mouth 2 (two) times a week with meals, Disp: 24 capsule, Rfl: 0    levocetirizine (XYZAL) 5 MG tablet, Take 1 tablet (5 mg total) by mouth every evening, Disp: 30 tablet, Rfl: 1    Menaquinone-7 (Vitamin K2) 100 MCG CAPS, Take 2 capsules (200 mcg total) by mouth daily with lunch Take with Vitamin D3, Disp: 180 capsule, Rfl: 3    nystatin (MYCOSTATIN) powder, Apply topically 3 (three) times a day, Disp: 60 g, Rfl: 0    pantoprazole (PROTONIX) 40 mg tablet, Take 1 tablet (40 mg total) by mouth daily, Disp: 30 tablet, Rfl: 0    polyethylene glycol (MIRALAX) 17 g packet, Take 17 g by mouth daily for 7 days, Disp: 119 g, Rfl: 0    SUMAtriptan (IMITREX) 50 mg tablet, Take 1 tablet (50 mg total) by mouth once as needed for migraine may repeat in 2 hours if necessary, Disp: 10 tablet, Rfl: 0    Zepbound 5 MG/0.5ML auto-injector, , Disp: , Rfl:     Zepbound 7.5 MG/0.5ML auto-injector, INJECT 7.5MG UNDER THE SKIN ONCE A WEEK, Disp: , Rfl:     Current Allergies     Allergies as of 2024 - Reviewed 2024   Allergen Reaction Noted    Kiwi extract - food allergy Itching and Swelling 08/10/2005    Ciprofloxacin Anxiety 2024            The following portions of the patient's history were reviewed and updated as appropriate: allergies, current medications, past family history, past medical history, past social history, past surgical history and problem list.     Past Medical History:   Diagnosis Date    Arthritis     Nikole-menopause        Past Surgical History:   Procedure Laterality Date     SECTION      GASTRIC BYPASS         Family History   Problem Relation Age of Onset    Hypertension Mother     Diabetes Mother     Glaucoma Father     Dementia Father          Medications have been  verified.        Objective   /82   Pulse 79   Temp 97.5 °F (36.4 °C)   Resp 18   Wt 79.4 kg (175 lb)   SpO2 97%   BMI 29.12 kg/m²        Physical Exam     Physical Exam

## 2024-11-05 LAB — BACTERIA UR CULT: NORMAL

## 2024-12-09 NOTE — PROGRESS NOTES
"Name: Abena Friedman      : 1975      MRN: [id number]  Encounter Provider: Lolita Jack PA-C  Encounter Date: 2024   Encounter department: [facility]  :  Assessment & Plan  Encounter for annual routine gynecological examination    Exercise 150-300 minutes per week minimum including weight bearing exercises.   Pap with high risk HPV Q 5 years, if normal.    Call your insurance company to verify coverage prior to completing any ordered tests.       Kegels 20 times twice daily.   Silicone based lubricant with sex. (Use water based lubricant with condoms or sexual toys.)  Vaginal moisturizers twice weekly as needed.   Discussed considering hormonal treatment for menopausal symptoms and irregular menses  Return to office in one year or sooner, if needed.     Premenopausal patient  Hot flashes- provided verbal/written education on natural supplements for these symptoms.   Patient states when she was on zepbound her hot flashes improved, was off for a while due to backorder, will be starting on it again soon.   Vaginal dryness with intercourse- advised patient to begin using lubrication with intercourse (silicone based/water based/coconut oil), suggested vaginal moisturizers 2x weekly, discussed the value of foreplay to let the body \"warm up\" allowing natural lubrication and relaxation,   Mood swings- least of the patient's concerns, feels like this is manageable but has taken note of this change  Referral to menopause specialists    Orders:    Ambulatory Referral to Obstetrics / Gynecology    Ambulatory Referral to Obstetrics / Gynecology; Future    Palpitations  2024 TSH  WNL  2024 CBC WNL  Occurs randomly, with no specific trigger. Feels like heart is out of rhythm for a few moments then goes away. This has been occurring more frequently  Referral to cardiology  Go to the hospital if you experience chest pain, SOB or prolonged sensation of " palpitations    Screen for colon cancer  Never had one completed  Discussed cologaurd vs colonoscopy and the risks/benefits of each  Patient educated that colonoscopy is the gold standard for colon cancer screening.  Patient is willing to do a colonoscopy  Referral to GI  Encounter for screening mammogram for malignant neoplasm of breast    Patient refuses mammogram of breast due to having a negative experience in her 20s   Patient requested U/S monitoring which she states her previous providers prescribed her for screening instead  Patient is aware that mammograms are the standard protocol for breast cancer screening and there is increased risk of missing a mass with ultrasound only imaging. Despite this information, the patient would still like to receive breast ultrasounds instead.  Monthly breast exams encouraged. Reviewed the signs and symptoms of breast cancer and advised the patient to call if she experiences them  Patient is aware that insurance may not cover breast U/S     Pap smear for cervical cancer screening  Last pap on file 2018   Patient endorses no history of abnormal paps  Pap collected today        History of Present Illness   HPI  Abena Friedman is a 49 y.o. female  who presents for an annual exam.    LMP 24 unpredictable, lasts 4-5 days then 1 week later will spot for 2 days  Vasomotor symptoms Yes  Sexually active Yes, Monogamous   Birth control: none  Last pap 2018 HPV negative, no other results available  Next pap: collected today  Self breast exam yes  Mammogram: Patient has not received a mammogram, she states she had one done in her 20s that was extremely painful and refuses to do them again, she has been having breast U/Ss instead for screening and is aware that insurance may not cover this.  Colon Cancer screening: None    Menopause symptoms- dryness/pain with intercourse,  mood swings and night sweats/hot flashes. She states that her Mother did not experience similar  "menopausal symptoms because \"she was on hormones for kidney treatment.\"    She notes she gets frequent UTIs. She takes cranberry supplements for this and states she will take pyridium when feeling UTI symptoms. Patient denies UTI symptoms at this time    Hereditary Cancer Screening  FH Breast/Ovarian cancer: None  FH Uterine cancer: None  FH Colon cancer: None  Cancer-related family history is negative for Breast cancer, Colon cancer, and Ovarian cancer.      Review of Systems   Constitutional:  Negative for chills, fatigue, fever and unexpected weight change.   Eyes:  Negative for visual disturbance.   Respiratory:  Negative for shortness of breath.    Cardiovascular:  Positive for palpitations (became more frequent 2-3 months ago, about 1-2x/weekly.). Negative for chest pain.   Gastrointestinal:  Negative for abdominal pain, anal bleeding, blood in stool, constipation, diarrhea, nausea and vomiting.   Endocrine: Negative for cold intolerance and heat intolerance.   Genitourinary:  Positive for dyspareunia and menstrual problem. Negative for difficulty urinating, dysuria, frequency, hematuria, pelvic pain, urgency, vaginal bleeding, vaginal discharge and vaginal pain.   Musculoskeletal:  Negative for back pain.   Skin:  Negative for rash.   Neurological:  Positive for headaches. Negative for dizziness, syncope and light-headedness.   Hematological:  Does not bruise/bleed easily.   Psychiatric/Behavioral:  Negative for dysphoric mood. The patient is not nervous/anxious.      Current Outpatient Medications on File Prior to Visit   Medication Sig Dispense Refill    acetaminophen (TYLENOL) 650 mg CR tablet Take 1 tablet (650 mg total) by mouth every 8 (eight) hours as needed for mild pain 30 tablet 0    Diclofenac Sodium (VOLTAREN) 1 % Apply 2 g topically 4 (four) times a day 50 g 0    levocetirizine (XYZAL) 5 MG tablet Take 1 tablet (5 mg total) by mouth every evening 30 tablet 1    pantoprazole (PROTONIX) 40 mg " "tablet Take 1 tablet (40 mg total) by mouth daily 30 tablet 0    polyethylene glycol (MIRALAX) 17 g packet Take 17 g by mouth daily for 7 days 119 g 0    SUMAtriptan (IMITREX) 50 mg tablet Take 1 tablet (50 mg total) by mouth once as needed for migraine may repeat in 2 hours if necessary 10 tablet 0    Zepbound 10 MG/0.5ML auto-injector INJECT 10 MG UNDER THE SKIN ONCE WEEKLY      Cholecalciferol (Vitamin D3) 50 MCG (2000 UT) capsule Take 1 capsule (2,000 Units total) by mouth daily Take with Vitamin K2 (Patient not taking: Reported on 12/11/2024) 90 capsule 3    ergocalciferol (VITAMIN D2) 50,000 units Take 1 capsule (50,000 Units total) by mouth 2 (two) times a week with meals (Patient not taking: Reported on 12/11/2024) 24 capsule 0    Menaquinone-7 (Vitamin K2) 100 MCG CAPS Take 2 capsules (200 mcg total) by mouth daily with lunch Take with Vitamin D3 (Patient not taking: Reported on 12/11/2024) 180 capsule 3    nystatin (MYCOSTATIN) powder Apply topically 3 (three) times a day (Patient not taking: Reported on 12/11/2024) 60 g 0    Zepbound 5 MG/0.5ML auto-injector  (Patient not taking: Reported on 12/11/2024)      Zepbound 7.5 MG/0.5ML auto-injector INJECT 7.5MG UNDER THE SKIN ONCE A WEEK (Patient not taking: Reported on 12/11/2024)       No current facility-administered medications on file prior to visit.      Social History     Tobacco Use    Smoking status: Never     Passive exposure: Never    Smokeless tobacco: Never   Vaping Use    Vaping status: Never Used   Substance and Sexual Activity    Alcohol use: Yes     Comment: socially    Drug use: Never    Sexual activity: Not Currently     Partners: Male     Comment:         Objective   BP 92/60 (BP Location: Left arm, Patient Position: Sitting, Cuff Size: Standard)   Ht 5' 3.5\" (1.613 m)   Wt 76.2 kg (168 lb)   LMP 11/20/2024 (Exact Date)   BMI 29.29 kg/m²      Physical Exam  Vitals and nursing note reviewed.   Constitutional:       General: She is " not in acute distress.     Appearance: She is well-developed.   HENT:      Head: Normocephalic and atraumatic.   Eyes:      Extraocular Movements: Extraocular movements intact.   Cardiovascular:      Rate and Rhythm: Normal rate and regular rhythm.   Pulmonary:      Effort: Pulmonary effort is normal. No respiratory distress.      Breath sounds: Normal breath sounds.   Chest:   Breasts:     Right: No swelling, bleeding, inverted nipple, mass, nipple discharge, skin change or tenderness.      Left: No swelling, bleeding, inverted nipple, mass, nipple discharge, skin change or tenderness.   Abdominal:      Palpations: Abdomen is soft.      Tenderness: There is no abdominal tenderness.      Hernia: There is no hernia in the left inguinal area or right inguinal area.   Genitourinary:     General: Normal vulva.      Exam position: Lithotomy position.      Pubic Area: No rash.       Labia:         Right: No rash, tenderness or lesion.         Left: No rash, tenderness or lesion.       Urethra: No urethral pain or urethral swelling.      Vagina: No vaginal discharge, erythema, tenderness, bleeding or lesions.      Cervix: No cervical motion tenderness, discharge, friability, lesion, erythema or cervical bleeding.      Uterus: Not enlarged and not tender.       Adnexa:         Right: No mass, tenderness or fullness.          Left: No mass, tenderness or fullness.        Rectum: Normal.         Lymphadenopathy:      Upper Body:      Right upper body: No supraclavicular, axillary or pectoral adenopathy.      Left upper body: No supraclavicular, axillary or pectoral adenopathy.      Lower Body: No right inguinal adenopathy. No left inguinal adenopathy.   Skin:     General: Skin is warm and dry.   Neurological:      Mental Status: She is alert.   Psychiatric:         Mood and Affect: Mood normal.         Behavior: Behavior normal.         Lolita Jack PA-C

## 2024-12-11 ENCOUNTER — CONSULT (OUTPATIENT)
Dept: OBGYN CLINIC | Facility: CLINIC | Age: 49
End: 2024-12-11

## 2024-12-11 VITALS
HEIGHT: 64 IN | BODY MASS INDEX: 28.68 KG/M2 | DIASTOLIC BLOOD PRESSURE: 60 MMHG | WEIGHT: 168 LBS | SYSTOLIC BLOOD PRESSURE: 92 MMHG

## 2024-12-11 DIAGNOSIS — Z12.4 PAP SMEAR FOR CERVICAL CANCER SCREENING: ICD-10-CM

## 2024-12-11 DIAGNOSIS — R00.2 PALPITATIONS: ICD-10-CM

## 2024-12-11 DIAGNOSIS — Z12.31 ENCOUNTER FOR SCREENING MAMMOGRAM FOR MALIGNANT NEOPLASM OF BREAST: ICD-10-CM

## 2024-12-11 DIAGNOSIS — Z12.11 SCREEN FOR COLON CANCER: ICD-10-CM

## 2024-12-11 DIAGNOSIS — Z01.419 ENCOUNTER FOR ANNUAL ROUTINE GYNECOLOGICAL EXAMINATION: Primary | ICD-10-CM

## 2024-12-11 DIAGNOSIS — N95.9 PREMENOPAUSAL PATIENT: ICD-10-CM

## 2024-12-11 PROCEDURE — G0476 HPV COMBO ASSAY CA SCREEN: HCPCS

## 2024-12-11 PROCEDURE — G0145 SCR C/V CYTO,THINLAYER,RESCR: HCPCS

## 2024-12-12 LAB
HPV HR 12 DNA CVX QL NAA+PROBE: NEGATIVE
HPV16 DNA CVX QL NAA+PROBE: NEGATIVE
HPV18 DNA CVX QL NAA+PROBE: NEGATIVE

## 2024-12-13 ENCOUNTER — OFFICE VISIT (OUTPATIENT)
Dept: OBGYN CLINIC | Facility: CLINIC | Age: 49
End: 2024-12-13
Payer: COMMERCIAL

## 2024-12-13 ENCOUNTER — APPOINTMENT (OUTPATIENT)
Dept: RADIOLOGY | Facility: CLINIC | Age: 49
End: 2024-12-13
Payer: COMMERCIAL

## 2024-12-13 VITALS
HEART RATE: 69 BPM | SYSTOLIC BLOOD PRESSURE: 112 MMHG | WEIGHT: 168 LBS | BODY MASS INDEX: 28.68 KG/M2 | RESPIRATION RATE: 18 BRPM | DIASTOLIC BLOOD PRESSURE: 77 MMHG | OXYGEN SATURATION: 99 % | HEIGHT: 64 IN

## 2024-12-13 DIAGNOSIS — M25.562 CHRONIC PAIN OF LEFT KNEE: Primary | ICD-10-CM

## 2024-12-13 DIAGNOSIS — G89.29 CHRONIC PAIN OF LEFT KNEE: Primary | ICD-10-CM

## 2024-12-13 DIAGNOSIS — M25.562 CHRONIC PAIN OF LEFT KNEE: ICD-10-CM

## 2024-12-13 DIAGNOSIS — G89.29 CHRONIC PAIN OF RIGHT KNEE: ICD-10-CM

## 2024-12-13 DIAGNOSIS — M25.561 ACUTE PAIN OF RIGHT KNEE: ICD-10-CM

## 2024-12-13 DIAGNOSIS — M25.561 CHRONIC PAIN OF RIGHT KNEE: ICD-10-CM

## 2024-12-13 DIAGNOSIS — G89.29 CHRONIC PAIN OF LEFT KNEE: ICD-10-CM

## 2024-12-13 DIAGNOSIS — M17.0 BILATERAL PRIMARY OSTEOARTHRITIS OF KNEE: ICD-10-CM

## 2024-12-13 PROCEDURE — 99204 OFFICE O/P NEW MOD 45 MIN: CPT | Performed by: ORTHOPAEDIC SURGERY

## 2024-12-13 PROCEDURE — 73564 X-RAY EXAM KNEE 4 OR MORE: CPT

## 2024-12-13 NOTE — PROGRESS NOTES
Patient Name:  Abena Friedman  MRN:  [id number]    Assessment & Plan     1. Chronic pain of left knee  -     XR knee 4+ vw left injury; Future; Expected date: 12/13/2024  -     Injection Procedure Prior Authorization; Future  2. Chronic pain of right knee  -     Ambulatory Referral to Orthopedic Surgery  -     XR knee 4+ vw right injury; Future; Expected date: 12/13/2024  -     Injection Procedure Prior Authorization; Future  3. Bilateral primary osteoarthritis of knee  -     Injection Procedure Prior Authorization; Future      Bilateral knee osteoarthritis  X-rays reviewed in office today with patient  Nonoperative management was discussed in the form of oral and topical medications, corticosteroid injections, visco injections, physical therapy, bracing  Operative management was discussed in the form of total knee arthroplasty. Patient would like to continue nonoperative management at this time.  Patient would like to have visco again as she had relief from this in the past.   Preauthorization requested for Euflexxa series for bilateral knees  OTC analgesics as needed for symptom management  Follow up when visco is approved    Chief Complaint     Bilateral knee pain    History of the Present Illness     Abena Friedman is a 49 y.o. female with Bilateral knee pain ongoing for 7 years. She was previously treated with image guided viscosupplementation. She has had two injections in each knee. Last injection 1.5 years ago, was a three shot series. Left knee more painful than right knee. She has never tried the corticosteroid injection. Pain is managed with Motrin. 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 Pain worsens with cold weather and going down stairs. Her pain today is rated 6/10.    Review of Systems     Review of Systems   Constitutional:  Negative for chills and fever.   HENT:  Negative for ear pain and sore throat.    Eyes:  Negative for pain and visual  "disturbance.   Respiratory:  Negative for cough and shortness of breath.    Cardiovascular:  Negative for chest pain and palpitations.   Gastrointestinal:  Negative for abdominal pain and vomiting.   Genitourinary:  Negative for dysuria and hematuria.   Musculoskeletal:  Negative for arthralgias and back pain.   Skin:  Negative for color change and rash.   Neurological:  Negative for seizures and syncope.   All other systems reviewed and are negative.      Physical Exam     /77   Pulse 69   Resp 18   Ht 5' 3.5\" (1.613 m)   Wt 76.2 kg (168 lb)   LMP 11/20/2024 (Exact Date)   SpO2 99%   BMI 29.29 kg/m²     Left  Knee  Range of motion from 0 to 130.    There is mild crepitus with range of motion.   There is no effusion.    There is no tenderness over the knee.    There is 5/5 quadriceps strength and equal tone.    The patient is able to perform a straight leg raise.    negative patellar grind test.  Varus stress testing reveals no instability at 0 and 30 degrees   Valgus stress testing reveals no instability at 0 and 30 degrees  The patient is neurovascular intact distally.    Right Knee  Range of motion from 0 to 130.    There is mild crepitus with range of motion.   There is no effusion.    There is no tenderness over the knee.    There is 5/5 quadriceps strength and equal tone.    The patient is able to perform a straight leg raise.    negative patellar grind test.  Varus stress testing reveals no instability at 0 and 30 degrees   Valgus stress testing reveals no instability at 0 and 30 degrees  The patient is neurovascular intact distally.    Eyes:  Anicteric sclerae.  Neck:  Supple.  Lungs:  Normal respiratory effort.  Cardiovascular:  Capillary refill is less than 2 seconds.  Skin:  Intact without erythema.  Neurologic:  Sensation grossly intact to light touch.  Psychiatric:  Mood and affect are appropriate.    Data Review     I have personally reviewed pertinent films in PACS, and my interpretation " follows:    X-rays taken 2024 of Right knee moderate tricompartmental degenerative changes with patellofemoral joint space narrowing and osteophyte formation.    X-rays taken 2024 of Left knee moderate tricompartmental degenerative changes with patellofemoral joint space narrowing and osteophyte formation.    Past Medical History:   Diagnosis Date   • Arthritis    • Nikole-menopause        Past Surgical History:   Procedure Laterality Date   •  SECTION     • GASTRIC BYPASS         Allergies   Allergen Reactions   • Kiwi Extract - Food Allergy Itching and Swelling     From Climacs; swollen tongue   • Ciprofloxacin Anxiety     Irritable, anxious, depressed, tearful       Current Outpatient Medications on File Prior to Visit   Medication Sig Dispense Refill   • acetaminophen (TYLENOL) 650 mg CR tablet Take 1 tablet (650 mg total) by mouth every 8 (eight) hours as needed for mild pain 30 tablet 0   • Diclofenac Sodium (VOLTAREN) 1 % Apply 2 g topically 4 (four) times a day 50 g 0   • levocetirizine (XYZAL) 5 MG tablet Take 1 tablet (5 mg total) by mouth every evening 30 tablet 1   • pantoprazole (PROTONIX) 40 mg tablet Take 1 tablet (40 mg total) by mouth daily 30 tablet 0   • SUMAtriptan (IMITREX) 50 mg tablet Take 1 tablet (50 mg total) by mouth once as needed for migraine may repeat in 2 hours if necessary 10 tablet 0   • Zepbound 10 MG/0.5ML auto-injector INJECT 10 MG UNDER THE SKIN ONCE WEEKLY     • Cholecalciferol (Vitamin D3) 50 MCG (2000 UT) capsule Take 1 capsule (2,000 Units total) by mouth daily Take with Vitamin K2 (Patient not taking: Reported on 2024) 90 capsule 3   • ergocalciferol (VITAMIN D2) 50,000 units Take 1 capsule (50,000 Units total) by mouth 2 (two) times a week with meals (Patient not taking: Reported on 2024) 24 capsule 0   • Menaquinone-7 (Vitamin K2) 100 MCG CAPS Take 2 capsules (200 mcg total) by mouth daily with lunch Take with Vitamin D3 (Patient not  taking: Reported on 12/11/2024) 180 capsule 3   • nystatin (MYCOSTATIN) powder Apply topically 3 (three) times a day (Patient not taking: Reported on 12/11/2024) 60 g 0   • polyethylene glycol (MIRALAX) 17 g packet Take 17 g by mouth daily for 7 days 119 g 0   • Zepbound 5 MG/0.5ML auto-injector  (Patient not taking: Reported on 12/11/2024)     • Zepbound 7.5 MG/0.5ML auto-injector INJECT 7.5MG UNDER THE SKIN ONCE A WEEK (Patient not taking: Reported on 12/11/2024)       No current facility-administered medications on file prior to visit.       Social History     Tobacco Use   • Smoking status: Never     Passive exposure: Never   • Smokeless tobacco: Never   Vaping Use   • Vaping status: Never Used   Substance Use Topics   • Alcohol use: Yes     Comment: socially   • Drug use: Never       Family History   Problem Relation Age of Onset   • Hypertension Mother    • Diabetes Mother    • Glaucoma Father    • Dementia Father    • Breast cancer Neg Hx    • Colon cancer Neg Hx    • Ovarian cancer Neg Hx              Procedures Performed     Procedures  None      Giuliana Menjivar  Scribe Attestation    I,:  Giuliana Menjivar am acting as a scribe while in the presence of the attending physician.:       I,:  Mehul Sherwood, DO personally performed the services described in this documentation    as scribed in my presence.:

## 2024-12-17 ENCOUNTER — TELEPHONE (OUTPATIENT)
Dept: GASTROENTEROLOGY | Facility: CLINIC | Age: 49
End: 2024-12-17

## 2024-12-17 NOTE — TELEPHONE ENCOUNTER
Pt has an upcoming appt with HEIDY Clarke for Colonoscopy consultation.  Called pt and LMOM to call us back and let us know if had done a colonoscopy in the past so we can have the report before her upcoming visit.  Office # provided to call us back.

## 2024-12-19 ENCOUNTER — RESULTS FOLLOW-UP (OUTPATIENT)
Age: 49
End: 2024-12-19

## 2024-12-19 LAB
LAB AP GYN PRIMARY INTERPRETATION: NORMAL
Lab: NORMAL

## 2024-12-24 ENCOUNTER — OFFICE VISIT (OUTPATIENT)
Dept: GASTROENTEROLOGY | Facility: CLINIC | Age: 49
End: 2024-12-24
Payer: COMMERCIAL

## 2024-12-24 VITALS
SYSTOLIC BLOOD PRESSURE: 108 MMHG | TEMPERATURE: 96.9 F | OXYGEN SATURATION: 93 % | HEART RATE: 61 BPM | WEIGHT: 165 LBS | BODY MASS INDEX: 28.17 KG/M2 | HEIGHT: 64 IN | DIASTOLIC BLOOD PRESSURE: 72 MMHG

## 2024-12-24 DIAGNOSIS — K58.0 IRRITABLE BOWEL SYNDROME WITH DIARRHEA: Primary | ICD-10-CM

## 2024-12-24 DIAGNOSIS — Z12.11 SCREEN FOR COLON CANCER: ICD-10-CM

## 2024-12-24 PROCEDURE — 99203 OFFICE O/P NEW LOW 30 MIN: CPT | Performed by: PHYSICIAN ASSISTANT

## 2024-12-24 NOTE — PROGRESS NOTES
Name: Abena Friedman      : 1975      MRN: [id number]  Encounter Provider: Tricia Malone PA-C  Encounter Date: 2024   Encounter department: [facility] [place]  :  Assessment & Plan  Screen for colon cancer  She has never had a colonoscopy  Average risk  Reviewed risks/benefits  She will hold her Zepbound for 7 days prior to the procedure - she verbalizes understanding    Irritable bowel syndrome with diarrhea  Issues with gas pains and diarrhea episodes s/p RNY gastric bypass 8 years ago  She tries to follow a strict diet  She knows high sugar foods will cause dumping  Will give a Xifaxan course        History of Present Illness   HPI  Abena Friedman is a 49 y.o. female with a history of morbid obesity status post John-en-Y gastric bypass, GERD, anxiety who presents for evaluation to schedule colonoscopy.  She has never had a colonoscopy.  She denies any known family history of colorectal cancer.  She reports that ever since her gastric bypass she has had issues with recurrent diarrhea and abdominal pains.  She reports that typically every day she has abdominal pains which she describes as gas pains.  She reports episodes of dumping.  She can almost always determine what has caused the dumping.  She strictly avoids ice cream and orange juice.  Sometimes it happens unpredictably.  She has no rectal bleeding.  She has no vomiting.  She reports that occasionally food will have a hard time going down if she eats too quickly.  History obtained from: patient    Review of Systems   Constitutional:  Positive for appetite change. Negative for activity change, fatigue, fever and unexpected weight change.   Respiratory:  Negative for cough, shortness of breath and wheezing.    Gastrointestinal:  Positive for abdominal pain and diarrhea. Negative for abdominal distention, blood in stool, constipation, nausea and vomiting.   Endocrine: Negative for cold intolerance and heat  intolerance.   Genitourinary:  Negative for dysuria, flank pain and hematuria.   Neurological:  Positive for dizziness. Negative for numbness and headaches.     Medical History Reviewed by provider this encounter:  Problems     .  Past Medical History   Past Medical History:   Diagnosis Date    Arthritis     Inkole-menopause      Past Surgical History:   Procedure Laterality Date     SECTION      GASTRIC BYPASS       Family History   Problem Relation Age of Onset    Hypertension Mother     Diabetes Mother     Glaucoma Father     Dementia Father     Breast cancer Neg Hx     Colon cancer Neg Hx     Ovarian cancer Neg Hx       reports that she has never smoked. She has never been exposed to tobacco smoke. She has never used smokeless tobacco. She reports current alcohol use. She reports that she does not use drugs.  Current Outpatient Medications on File Prior to Visit   Medication Sig Dispense Refill    acetaminophen (TYLENOL) 650 mg CR tablet Take 1 tablet (650 mg total) by mouth every 8 (eight) hours as needed for mild pain 30 tablet 0    Diclofenac Sodium (VOLTAREN) 1 % Apply 2 g topically 4 (four) times a day 50 g 0    levocetirizine (XYZAL) 5 MG tablet Take 1 tablet (5 mg total) by mouth every evening 30 tablet 1    Menaquinone-7 (Vitamin K2) 100 MCG CAPS Take 2 capsules (200 mcg total) by mouth daily with lunch Take with Vitamin D3 (Patient taking differently: Take 200 mcg by mouth daily with lunch Take with Vitamin D3  prn) 180 capsule 3    nystatin (MYCOSTATIN) powder Apply topically 3 (three) times a day (Patient taking differently: Apply topically 3 (three) times a day Prn) 60 g 0    pantoprazole (PROTONIX) 40 mg tablet Take 1 tablet (40 mg total) by mouth daily 30 tablet 0    polyethylene glycol (MIRALAX) 17 g packet Take 17 g by mouth daily for 7 days (Patient taking differently: Take 17 g by mouth daily prn) 119 g 0    SUMAtriptan (IMITREX) 50 mg tablet Take 1 tablet (50 mg total) by mouth  once as needed for migraine may repeat in 2 hours if necessary 10 tablet 0    Zepbound 10 MG/0.5ML auto-injector INJECT 10 MG UNDER THE SKIN ONCE WEEKLY      Cholecalciferol (Vitamin D3) 50 MCG (2000 UT) capsule Take 1 capsule (2,000 Units total) by mouth daily Take with Vitamin K2 (Patient not taking: Reported on 12/24/2024) 90 capsule 3    ergocalciferol (VITAMIN D2) 50,000 units Take 1 capsule (50,000 Units total) by mouth 2 (two) times a week with meals (Patient not taking: Reported on 12/24/2024) 24 capsule 0    Zepbound 5 MG/0.5ML auto-injector  (Patient not taking: Reported on 8/9/2024)      Zepbound 7.5 MG/0.5ML auto-injector INJECT 7.5MG UNDER THE SKIN ONCE A WEEK (Patient not taking: Reported on 12/24/2024)       No current facility-administered medications on file prior to visit.     Allergies   Allergen Reactions    Kiwi Extract - Food Allergy Itching and Swelling     From Climacs; swollen tongue    Ciprofloxacin Anxiety     Irritable, anxious, depressed, tearful      Current Outpatient Medications on File Prior to Visit   Medication Sig Dispense Refill    acetaminophen (TYLENOL) 650 mg CR tablet Take 1 tablet (650 mg total) by mouth every 8 (eight) hours as needed for mild pain 30 tablet 0    Diclofenac Sodium (VOLTAREN) 1 % Apply 2 g topically 4 (four) times a day 50 g 0    levocetirizine (XYZAL) 5 MG tablet Take 1 tablet (5 mg total) by mouth every evening 30 tablet 1    Menaquinone-7 (Vitamin K2) 100 MCG CAPS Take 2 capsules (200 mcg total) by mouth daily with lunch Take with Vitamin D3 (Patient taking differently: Take 200 mcg by mouth daily with lunch Take with Vitamin D3  prn) 180 capsule 3    nystatin (MYCOSTATIN) powder Apply topically 3 (three) times a day (Patient taking differently: Apply topically 3 (three) times a day Prn) 60 g 0    pantoprazole (PROTONIX) 40 mg tablet Take 1 tablet (40 mg total) by mouth daily 30 tablet 0    polyethylene glycol (MIRALAX) 17 g packet Take 17 g by mouth  "daily for 7 days (Patient taking differently: Take 17 g by mouth daily prn) 119 g 0    SUMAtriptan (IMITREX) 50 mg tablet Take 1 tablet (50 mg total) by mouth once as needed for migraine may repeat in 2 hours if necessary 10 tablet 0    Zepbound 10 MG/0.5ML auto-injector INJECT 10 MG UNDER THE SKIN ONCE WEEKLY      Cholecalciferol (Vitamin D3) 50 MCG (2000 UT) capsule Take 1 capsule (2,000 Units total) by mouth daily Take with Vitamin K2 (Patient not taking: Reported on 12/24/2024) 90 capsule 3    ergocalciferol (VITAMIN D2) 50,000 units Take 1 capsule (50,000 Units total) by mouth 2 (two) times a week with meals (Patient not taking: Reported on 12/24/2024) 24 capsule 0    Zepbound 5 MG/0.5ML auto-injector  (Patient not taking: Reported on 8/9/2024)      Zepbound 7.5 MG/0.5ML auto-injector INJECT 7.5MG UNDER THE SKIN ONCE A WEEK (Patient not taking: Reported on 12/24/2024)       No current facility-administered medications on file prior to visit.      Social History     Tobacco Use    Smoking status: Never     Passive exposure: Never    Smokeless tobacco: Never   Vaping Use    Vaping status: Never Used   Substance and Sexual Activity    Alcohol use: Yes     Comment: socially    Drug use: Never    Sexual activity: Not Currently     Partners: Male     Comment:         Objective   /72 (BP Location: Left arm, Patient Position: Sitting, Cuff Size: Standard)   Pulse 61   Temp (!) 96.9 °F (36.1 °C) (Temporal)   Ht 5' 3.5\" (1.613 m)   Wt 74.8 kg (165 lb)   LMP 11/20/2024 (Exact Date)   SpO2 93%   BMI 28.77 kg/m²      Physical Exam  Vitals reviewed.   Constitutional:       Appearance: Normal appearance.   HENT:      Head: Normocephalic and atraumatic.   Eyes:      Extraocular Movements: Extraocular movements intact.      Pupils: Pupils are equal, round, and reactive to light.   Cardiovascular:      Rate and Rhythm: Normal rate and regular rhythm.   Abdominal:      General: Bowel sounds are normal. There " is no distension.      Palpations: Abdomen is soft. There is no mass.      Tenderness: There is no abdominal tenderness.   Skin:     General: Skin is warm and dry.      Coloration: Skin is not jaundiced.   Neurological:      General: No focal deficit present.      Mental Status: She is alert and oriented to person, place, and time.

## 2024-12-24 NOTE — PATIENT INSTRUCTIONS
Scheduled date of colonoscopy (as of today):1/17/25  Physician performing colonoscopy:Beulah  Location of colonoscopy:[place]  Bowel prep reviewed with patient:miralax/dulcolax  Instructions reviewed with patient by:Sagrario ARREDONDO  Clearances:  none    Zepbound 7 day hold

## 2025-01-13 ENCOUNTER — OFFICE VISIT (OUTPATIENT)
Dept: FAMILY MEDICINE CLINIC | Facility: CLINIC | Age: 50
End: 2025-01-13
Payer: COMMERCIAL

## 2025-01-13 ENCOUNTER — PROCEDURE VISIT (OUTPATIENT)
Dept: OBGYN CLINIC | Facility: CLINIC | Age: 50
End: 2025-01-13
Payer: COMMERCIAL

## 2025-01-13 VITALS — WEIGHT: 173 LBS | HEIGHT: 64 IN | BODY MASS INDEX: 29.53 KG/M2

## 2025-01-13 VITALS
WEIGHT: 168.8 LBS | DIASTOLIC BLOOD PRESSURE: 62 MMHG | OXYGEN SATURATION: 98 % | HEIGHT: 64 IN | SYSTOLIC BLOOD PRESSURE: 90 MMHG | TEMPERATURE: 98.3 F | RESPIRATION RATE: 16 BRPM | BODY MASS INDEX: 28.82 KG/M2 | HEART RATE: 74 BPM

## 2025-01-13 DIAGNOSIS — Z00.00 ANNUAL PHYSICAL EXAM: Primary | ICD-10-CM

## 2025-01-13 DIAGNOSIS — E86.1 HYPOTENSION DUE TO HYPOVOLEMIA: ICD-10-CM

## 2025-01-13 DIAGNOSIS — K91.2 POSTSURGICAL MALABSORPTION: ICD-10-CM

## 2025-01-13 DIAGNOSIS — M17.0 BILATERAL PRIMARY OSTEOARTHRITIS OF KNEE: Primary | ICD-10-CM

## 2025-01-13 DIAGNOSIS — R00.2 PALPITATIONS: ICD-10-CM

## 2025-01-13 DIAGNOSIS — Z11.59 NEED FOR HEPATITIS C SCREENING TEST: ICD-10-CM

## 2025-01-13 DIAGNOSIS — J45.909 ASTHMA DUE TO ENVIRONMENTAL ALLERGIES: ICD-10-CM

## 2025-01-13 DIAGNOSIS — Z98.84 HISTORY OF ROUX-EN-Y GASTRIC BYPASS: ICD-10-CM

## 2025-01-13 DIAGNOSIS — E55.9 VITAMIN D DEFICIENCY: ICD-10-CM

## 2025-01-13 PROCEDURE — 99214 OFFICE O/P EST MOD 30 MIN: CPT | Performed by: FAMILY MEDICINE

## 2025-01-13 PROCEDURE — 99396 PREV VISIT EST AGE 40-64: CPT | Performed by: FAMILY MEDICINE

## 2025-01-13 PROCEDURE — 20610 DRAIN/INJ JOINT/BURSA W/O US: CPT | Performed by: PHYSICIAN ASSISTANT

## 2025-01-13 RX ORDER — HYALURONATE SODIUM 10 MG/ML
20 SYRINGE (ML) INTRAARTICULAR
Status: COMPLETED | OUTPATIENT
Start: 2025-01-13 | End: 2025-01-13

## 2025-01-13 RX ADMIN — Medication 20 MG: at 08:30

## 2025-01-13 NOTE — PROGRESS NOTES
Adult Annual Physical  Name: Abena Friedman      : 1975      MRN: [id number]  Encounter Provider: Marvin Cabezas MD  Encounter Date: 2025   Encounter department: [facility]    Assessment & Plan  Annual physical exam  Annual completed. Labs ordered.        Hypotension due to hypovolemia  Worse when she misses a dose of zepbound. Possibly anemia vs dehydration. Normal orthostatic pressures  Orders:  •  CBC and differential; Future  •  Iron Panel (Includes Ferritin, Iron Sat%, Iron, and TIBC); Future  •  TSH, 3rd generation with Free T4 reflex; Future  •  Comprehensive metabolic panel; Future    History of John-en-Y gastric bypass  Due for labs.   Orders:  •  CBC and differential; Future  •  Iron Panel (Includes Ferritin, Iron Sat%, Iron, and TIBC); Future  •  TSH, 3rd generation with Free T4 reflex; Future  •  Comprehensive metabolic panel; Future  •  Vitamin B12; Future    Postsurgical malabsorption    Orders:  •  CBC and differential; Future  •  Iron Panel (Includes Ferritin, Iron Sat%, Iron, and TIBC); Future  •  TSH, 3rd generation with Free T4 reflex; Future  •  Comprehensive metabolic panel; Future    Palpitations  A/w with lightheadedness and hypotension. Ambulatory pressures 90's/50's. Suspect anemia. Not on antihypertensives. History of anemia requiring venofer infusions in the past   Orders:  •  CBC and differential; Future  •  Iron Panel (Includes Ferritin, Iron Sat%, Iron, and TIBC); Future  •  TSH, 3rd generation with Free T4 reflex; Future  •  Comprehensive metabolic panel; Future    Asthma due to environmental allergies         Vitamin D deficiency    Orders:  •  Vitamin D 25 hydroxy; Future    Immunizations and preventive care screenings were discussed with patient today. Appropriate education was printed on patient's after visit summary.    Counseling:  Injury prevention: discussed safety/seat belts, safety helmets, smoke detectors, carbon monoxide detectors, and  smoking near bedding or upholstery.  Exercise: the importance of regular exercise/physical activity was discussed. Recommend exercise 3-5 times per week for at least 30 minutes.       Depression Screening and Follow-up Plan: Patient's depression screening was positive with a PHQ-2 score of 3. Their PHQ-9 score was 5.   Patient assessed for underlying major depression. Brief counseling provided and recommend additional follow-up/re-evaluation next office visit.     History of Present Illness     Adult Annual Physical:  Patient presents for annual physical. Received gel injection  BP hasve been low (0-50, lowest 80/50   Symptomatic. Felt lighteaded yesterday   History of anemia with iron infusions in the pasyt  Also experiencing palpitations  Plans to rescheduled colonscopy .     Diet and Physical Activity:  - Diet/Nutrition:. doest eat much  - Exercise: no formal exercise. active around the house    Depression Screening:  - PHQ-2 Score: 3  - PHQ-9 Score: 5    General Health:  - Sleep:. 5 hours. Hot flashes do interrupt  - Hearing: normal hearing bilateral ears.  - Vision: no vision problems.  - Dental: no dental visits for > 1 year. had an appt on Wednesday    /GYN Health:  - Follows with GYN: no.   - Menopause: perimenopausal.   - Last menstrual cycle: 2024.   - History of STDs: no  - Contraception:. none      Review of Systems  Medical History Reviewed by provider this encounter:  Tobacco  Allergies  Meds  Problems  Med Hx  Surg Hx  Fam Hx     .  Past Medical History   Past Medical History:   Diagnosis Date   • Arthritis    • Nikole-menopause      Past Surgical History:   Procedure Laterality Date   •  SECTION     • GASTRIC BYPASS       Family History   Problem Relation Age of Onset   • Hypertension Mother    • Diabetes Mother    • Glaucoma Father    • Dementia Father    • Breast cancer Neg Hx    • Colon cancer Neg Hx    • Ovarian cancer Neg Hx       reports that she has never smoked.  She has never been exposed to tobacco smoke. She has never used smokeless tobacco. She reports current alcohol use. She reports that she does not use drugs.  Current Outpatient Medications on File Prior to Visit   Medication Sig Dispense Refill   • acetaminophen (TYLENOL) 650 mg CR tablet Take 1 tablet (650 mg total) by mouth every 8 (eight) hours as needed for mild pain 30 tablet 0   • Diclofenac Sodium (VOLTAREN) 1 % Apply 2 g topically 4 (four) times a day 50 g 0   • pantoprazole (PROTONIX) 40 mg tablet Take 1 tablet (40 mg total) by mouth daily 30 tablet 0   • polyethylene glycol (MIRALAX) 17 g packet Take 17 g by mouth daily for 7 days (Patient taking differently: Take 17 g by mouth daily prn) 119 g 0   • SUMAtriptan (IMITREX) 50 mg tablet Take 1 tablet (50 mg total) by mouth once as needed for migraine may repeat in 2 hours if necessary 10 tablet 0   • Zepbound 10 MG/0.5ML auto-injector INJECT 10 MG UNDER THE SKIN ONCE WEEKLY     • Menaquinone-7 (Vitamin K2) 100 MCG CAPS Take 2 capsules (200 mcg total) by mouth daily with lunch Take with Vitamin D3 (Patient taking differently: Take 200 mcg by mouth daily with lunch Take with Vitamin D3  prn) 180 capsule 3   • nystatin (MYCOSTATIN) powder Apply topically 3 (three) times a day (Patient taking differently: Apply topically 3 (three) times a day Prn) 60 g 0   • [DISCONTINUED] Cholecalciferol (Vitamin D3) 50 MCG (2000 UT) capsule Take 1 capsule (2,000 Units total) by mouth daily Take with Vitamin K2 (Patient not taking: Reported on 12/24/2024) 90 capsule 3   • [DISCONTINUED] ergocalciferol (VITAMIN D2) 50,000 units Take 1 capsule (50,000 Units total) by mouth 2 (two) times a week with meals (Patient not taking: Reported on 12/11/2024) 24 capsule 0   • [DISCONTINUED] levocetirizine (XYZAL) 5 MG tablet Take 1 tablet (5 mg total) by mouth every evening (Patient not taking: Reported on 1/13/2025) 30 tablet 1   • [DISCONTINUED] Zepbound 5 MG/0.5ML auto-injector  (Patient  not taking: Reported on 8/9/2024)     • [DISCONTINUED] Zepbound 7.5 MG/0.5ML auto-injector INJECT 7.5MG UNDER THE SKIN ONCE A WEEK (Patient not taking: Reported on 12/24/2024)       No current facility-administered medications on file prior to visit.     Allergies   Allergen Reactions   • Kiwi Extract - Food Allergy Itching and Swelling     From Climacs; swollen tongue   • Ciprofloxacin Anxiety     Irritable, anxious, depressed, tearful      Current Outpatient Medications on File Prior to Visit   Medication Sig Dispense Refill   • acetaminophen (TYLENOL) 650 mg CR tablet Take 1 tablet (650 mg total) by mouth every 8 (eight) hours as needed for mild pain 30 tablet 0   • Diclofenac Sodium (VOLTAREN) 1 % Apply 2 g topically 4 (four) times a day 50 g 0   • pantoprazole (PROTONIX) 40 mg tablet Take 1 tablet (40 mg total) by mouth daily 30 tablet 0   • polyethylene glycol (MIRALAX) 17 g packet Take 17 g by mouth daily for 7 days (Patient taking differently: Take 17 g by mouth daily prn) 119 g 0   • SUMAtriptan (IMITREX) 50 mg tablet Take 1 tablet (50 mg total) by mouth once as needed for migraine may repeat in 2 hours if necessary 10 tablet 0   • Zepbound 10 MG/0.5ML auto-injector INJECT 10 MG UNDER THE SKIN ONCE WEEKLY     • Menaquinone-7 (Vitamin K2) 100 MCG CAPS Take 2 capsules (200 mcg total) by mouth daily with lunch Take with Vitamin D3 (Patient taking differently: Take 200 mcg by mouth daily with lunch Take with Vitamin D3  prn) 180 capsule 3   • nystatin (MYCOSTATIN) powder Apply topically 3 (three) times a day (Patient taking differently: Apply topically 3 (three) times a day Prn) 60 g 0   • [DISCONTINUED] Cholecalciferol (Vitamin D3) 50 MCG (2000 UT) capsule Take 1 capsule (2,000 Units total) by mouth daily Take with Vitamin K2 (Patient not taking: Reported on 12/24/2024) 90 capsule 3   • [DISCONTINUED] ergocalciferol (VITAMIN D2) 50,000 units Take 1 capsule (50,000 Units total) by mouth 2 (two) times a week  "with meals (Patient not taking: Reported on 12/11/2024) 24 capsule 0   • [DISCONTINUED] levocetirizine (XYZAL) 5 MG tablet Take 1 tablet (5 mg total) by mouth every evening (Patient not taking: Reported on 1/13/2025) 30 tablet 1   • [DISCONTINUED] Zepbound 5 MG/0.5ML auto-injector  (Patient not taking: Reported on 8/9/2024)     • [DISCONTINUED] Zepbound 7.5 MG/0.5ML auto-injector INJECT 7.5MG UNDER THE SKIN ONCE A WEEK (Patient not taking: Reported on 12/24/2024)       No current facility-administered medications on file prior to visit.      Social History     Tobacco Use   • Smoking status: Never     Passive exposure: Never   • Smokeless tobacco: Never   Vaping Use   • Vaping status: Never Used   Substance and Sexual Activity   • Alcohol use: Yes     Comment: socially   • Drug use: Never   • Sexual activity: Not Currently     Partners: Male     Comment:        Objective   BP 90/62 (BP Location: Left arm, Patient Position: Sitting, Cuff Size: Large)   Pulse 74   Temp 98.3 °F (36.8 °C) (Tympanic)   Resp 16   Ht 5' 3.5\" (1.613 m)   Wt 76.6 kg (168 lb 12.8 oz)   SpO2 98%   BMI 29.43 kg/m²     Physical Exam  Vitals reviewed.   Constitutional:       General: She is not in acute distress.     Appearance: Normal appearance. She is not ill-appearing or toxic-appearing.   HENT:      Head: Normocephalic and atraumatic.   Eyes:      Extraocular Movements: Extraocular movements intact.   Cardiovascular:      Rate and Rhythm: Normal rate and regular rhythm.      Heart sounds: No murmur heard.  Pulmonary:      Effort: Pulmonary effort is normal. No respiratory distress.      Breath sounds: Normal breath sounds.   Skin:     Coloration: Skin is pale.   Neurological:      Mental Status: She is alert.   Psychiatric:         Mood and Affect: Mood normal.         Behavior: Behavior normal.         Thought Content: Thought content normal.     Administrative Statements   I have spent a total time of 35 minutes in caring for " this patient on the day of the visit/encounter including Risks and benefits of tx options, Instructions for management, Importance of tx compliance, Risk factor reductions, Impressions, Counseling / Coordination of care, Documenting in the medical record, Reviewing / ordering tests, medicine, procedures  , and Obtaining or reviewing history  .

## 2025-01-13 NOTE — PROGRESS NOTES
Patient has reported improvements from previous visco injections. Pain is rated at 6/10.  After discussing the options for treatment, the patient elected to proceed forward with Bilateral knee Euflexxa injections to reduce pain. Risks of injection, including but not limited to, post-injection pain, swelling, pseudo septic reaction, skin rash, itching, and infection were discussed in detail.  The patient understood, had no further questions and elected to proceed forward.  After sterile preparation, the Euflexxa injections were injected into the Bilateral knees, respectively.  The patient tolerated the procedure well no complications were noted.  The patient was instructed ice and elevate the extremity, limit strenuous activity for the next 2-3 days, and to contact us if there were any questions or concerns prior to their follow-up appointment.  I will see the patient back in 1 week for second injection.        Large joint arthrocentesis: R knee  Universal Protocol:  Risks and benefits: risks, benefits and alternatives were discussed  Consent given by: patient  Patient identity confirmed: verbally with patient  Supporting Documentation  Indications: pain   Procedure Details  Location: knee - R knee  Needle size: 22 G  Medications administered: 20 mg Sodium Hyaluronate (Viscosup) 20 MG/2ML    Patient tolerance: patient tolerated the procedure well with no immediate complications  Dressing:  Sterile dressing applied      Large joint arthrocentesis: L knee  Universal Protocol:  Risks and benefits: risks, benefits and alternatives were discussed  Consent given by: patient  Patient identity confirmed: verbally with patient  Supporting Documentation  Indications: pain   Procedure Details  Location: knee - L knee  Needle size: 22 G  Approach: lateral  Medications administered: 20 mg Sodium Hyaluronate (Viscosup) 20 MG/2ML    Patient tolerance: patient tolerated the procedure well with no immediate complications  Dressing:   Sterile dressing applied

## 2025-01-14 ENCOUNTER — TELEPHONE (OUTPATIENT)
Age: 50
End: 2025-01-14

## 2025-01-14 NOTE — LETTER
Hello,    Attached are your prep instructions for your upcoming procedure on 2/21/2025. If you have any questions, please give us a call at [phone].    Thank you,    [facility]      Medicine Instructions for Adults with Diabetes who Need a Bowel Prep       Follow these instructions when a BOWEL PREP is required for your procedure or surgery!    NOTE:   GLP-1 Agonists taken weekly: do not take in the 7 days before your procedure   SGLT-2 Inhibitors: do not take in the 4 days before your procedure     On the Day Before Surgery/Procedure  If you are having a procedure (e.g. Colonoscopy) or surgery that requires a bowel prep and you may have at least a clear liquid diet, follow the directions below based on the type of medicine you take for your diabetes.     Type of Medicine You Take Examples What to do   Pre-Mixed Insulin - Intermediate Acting Humalog® 75/25, Humulin® 70/30, Novolog® 70/30, Regular Insulin Take ½ your regular dose the evening before your procedure   Rapid/Fast Acting Insulin Humalog® U200, NovoLog®, Apidra®, Fiasp® Take ½ your regular dose the evening before your procedure.   Long-Acting Insulin Lantus®, Levemir®, Tresiba®, Toujeo®, Basaglar® Take your FULL regular dose the day before procedure   Oral Sulfonylurea Glipizide/Glimepiride/Glucotrol® Take ½ your regular dose the evening before your procedure   Other Oral Diabetes Medicines Metformin®, Glucophage®, Glucophage XR®, Riomet®, Glumetza®), Actose®, Avandia®, Glyset®, Prandin® Take your regular dose with dinner in the evening before your procedure   GLP-1 Agonists AdlyxinÒ, ByettaÒ, BydureonÒ, OzempicÒ, SoliquaÒ, TanzeumÒ, TrulicityÒ, VictozaÒ, Saxenda®, Rybelsus® If taken daily, take as normal    If taken weekly, do not take this medicine for 7 days before your procedure including the day of the procedure (resume taking after the procedure)   SGLT-2 Inhibitors Jardiance®, Invokana®, Farxiga®,    Steglatro®, Brenzavvy®, Qtern®, Segluromet®, Glyxambi®, Synjardy®, Synjardy XR®, Invokamet®, Invokamet XR®, Trijary XR®, Xigduo XR®, Steglujan® Do not take for 4 days before your procedure including the day of the procedure (resume taking after the procedure)                More information continued on back                    Medicine Instructions for Adults with Diabetes who Need a Bowel Prep  Page 2      On the Day of Surgery/Procedure  Follow the directions below based on the type of medicine you take for your diabetes.     Type of Medicine You Take Examples What to do   Long-Acting Insulin Lantus®, Levemir®, Tresiba®, Toujeo®, Basaglar®, Semglee®   If you usually take your Long-Acting Insulin in the morning, take the full dose as scheduled.   GLP-1 Agonists AdlyxinÒ, ByettaÒ, BydureonÒ, OzempicÒ, SoliquaÒ, TanzeumÒ, TrulicityÒ, VictozaÒ, Saxenda®, Rybelsus® Do NOT take this medicine on the day of your procedure (resume taking after the procedure)       On the Day of Surgery/Procedure (continued)  Except for the morning Long-Acting Insulin, DO NOT take ANY diabetic medicine on the day of your procedure unless you were instructed by the doctor who manages your diabetes medicines.    Continue to check your blood sugars.  If you have an insulin pump, ask your endocrinologist for instructions at least 3 days before your procedure. NOTE: If you are not able to ask your endocrinologist in advance, on the day of the procedure set your insulin pump to your basal rate only. Bring your insulin pump supplies to the hospital.     If you have any questions about taking your diabetes medicines prior to your procedure, please contact the doctor who manages your diabetes medicines.

## 2025-01-14 NOTE — ASSESSMENT & PLAN NOTE
Orders:    CBC and differential; Future    Iron Panel (Includes Ferritin, Iron Sat%, Iron, and TIBC); Future    TSH, 3rd generation with Free T4 reflex; Future    Comprehensive metabolic panel; Future

## 2025-01-14 NOTE — TELEPHONE ENCOUNTER
Patient r/s colonoscopy from 1/17/2025 to 2/21/2025 with Dr. Riojas at the [place] [facility]. Epifanio/dul prep instructions were sent to [facility].

## 2025-01-14 NOTE — ASSESSMENT & PLAN NOTE
Due for labs.   Orders:    CBC and differential; Future    Iron Panel (Includes Ferritin, Iron Sat%, Iron, and TIBC); Future    TSH, 3rd generation with Free T4 reflex; Future    Comprehensive metabolic panel; Future    Vitamin B12; Future

## 2025-01-16 ENCOUNTER — APPOINTMENT (OUTPATIENT)
Age: 50
End: 2025-01-16
Payer: COMMERCIAL

## 2025-01-16 DIAGNOSIS — Z11.59 NEED FOR HEPATITIS C SCREENING TEST: ICD-10-CM

## 2025-01-16 DIAGNOSIS — E86.1 HYPOTENSION DUE TO HYPOVOLEMIA: ICD-10-CM

## 2025-01-16 DIAGNOSIS — K91.2 POSTSURGICAL MALABSORPTION: ICD-10-CM

## 2025-01-16 DIAGNOSIS — Z98.84 HISTORY OF ROUX-EN-Y GASTRIC BYPASS: ICD-10-CM

## 2025-01-16 DIAGNOSIS — R00.2 PALPITATIONS: ICD-10-CM

## 2025-01-16 DIAGNOSIS — E55.9 VITAMIN D DEFICIENCY: ICD-10-CM

## 2025-01-16 LAB
25(OH)D3 SERPL-MCNC: 27.3 NG/ML (ref 30–100)
BASOPHILS # BLD AUTO: 0.07 THOUSANDS/ΜL (ref 0–0.1)
BASOPHILS NFR BLD AUTO: 1 % (ref 0–1)
EOSINOPHIL # BLD AUTO: 0.31 THOUSAND/ΜL (ref 0–0.61)
EOSINOPHIL NFR BLD AUTO: 5 % (ref 0–6)
ERYTHROCYTE [DISTWIDTH] IN BLOOD BY AUTOMATED COUNT: 13.8 % (ref 11.6–15.1)
FERRITIN SERPL-MCNC: 12 NG/ML (ref 11–307)
HCT VFR BLD AUTO: 41.9 % (ref 34.8–46.1)
HCV AB SER QL: NORMAL
HGB BLD-MCNC: 13.4 G/DL (ref 11.5–15.4)
IMM GRANULOCYTES # BLD AUTO: 0.01 THOUSAND/UL (ref 0–0.2)
IMM GRANULOCYTES NFR BLD AUTO: 0 % (ref 0–2)
LYMPHOCYTES # BLD AUTO: 2.46 THOUSANDS/ΜL (ref 0.6–4.47)
LYMPHOCYTES NFR BLD AUTO: 40 % (ref 14–44)
MCH RBC QN AUTO: 27.5 PG (ref 26.8–34.3)
MCHC RBC AUTO-ENTMCNC: 32 G/DL (ref 31.4–37.4)
MCV RBC AUTO: 86 FL (ref 82–98)
MONOCYTES # BLD AUTO: 0.42 THOUSAND/ΜL (ref 0.17–1.22)
MONOCYTES NFR BLD AUTO: 7 % (ref 4–12)
NEUTROPHILS # BLD AUTO: 2.85 THOUSANDS/ΜL (ref 1.85–7.62)
NEUTS SEG NFR BLD AUTO: 47 % (ref 43–75)
NRBC BLD AUTO-RTO: 0 /100 WBCS
PLATELET # BLD AUTO: 297 THOUSANDS/UL (ref 149–390)
PMV BLD AUTO: 11 FL (ref 8.9–12.7)
RBC # BLD AUTO: 4.87 MILLION/UL (ref 3.81–5.12)
TSH SERPL DL<=0.05 MIU/L-ACNC: 3.67 UIU/ML (ref 0.45–4.5)
VIT B12 SERPL-MCNC: 133 PG/ML (ref 180–914)
WBC # BLD AUTO: 6.12 THOUSAND/UL (ref 4.31–10.16)

## 2025-01-16 PROCEDURE — 80053 COMPREHEN METABOLIC PANEL: CPT

## 2025-01-16 PROCEDURE — 82306 VITAMIN D 25 HYDROXY: CPT

## 2025-01-16 PROCEDURE — 86803 HEPATITIS C AB TEST: CPT

## 2025-01-16 PROCEDURE — 82607 VITAMIN B-12: CPT

## 2025-01-16 PROCEDURE — 85025 COMPLETE CBC W/AUTO DIFF WBC: CPT

## 2025-01-16 PROCEDURE — 83540 ASSAY OF IRON: CPT

## 2025-01-16 PROCEDURE — 36415 COLL VENOUS BLD VENIPUNCTURE: CPT

## 2025-01-16 PROCEDURE — 84443 ASSAY THYROID STIM HORMONE: CPT

## 2025-01-16 PROCEDURE — 83550 IRON BINDING TEST: CPT

## 2025-01-16 PROCEDURE — 82728 ASSAY OF FERRITIN: CPT

## 2025-01-17 LAB
ALBUMIN SERPL BCG-MCNC: 4.2 G/DL (ref 3.5–5)
ALP SERPL-CCNC: 44 U/L (ref 34–104)
ALT SERPL W P-5'-P-CCNC: 24 U/L (ref 7–52)
ANION GAP SERPL CALCULATED.3IONS-SCNC: 5 MMOL/L (ref 4–13)
AST SERPL W P-5'-P-CCNC: 23 U/L (ref 13–39)
BILIRUB SERPL-MCNC: 0.62 MG/DL (ref 0.2–1)
BUN SERPL-MCNC: 10 MG/DL (ref 5–25)
CALCIUM SERPL-MCNC: 8.3 MG/DL (ref 8.4–10.2)
CHLORIDE SERPL-SCNC: 102 MMOL/L (ref 96–108)
CO2 SERPL-SCNC: 28 MMOL/L (ref 21–32)
CREAT SERPL-MCNC: 0.64 MG/DL (ref 0.6–1.3)
GFR SERPL CREATININE-BSD FRML MDRD: 105 ML/MIN/1.73SQ M
GLUCOSE P FAST SERPL-MCNC: 74 MG/DL (ref 65–99)
IRON SATN MFR SERPL: 21 % (ref 15–50)
IRON SERPL-MCNC: 59 UG/DL (ref 50–212)
POTASSIUM SERPL-SCNC: 4.1 MMOL/L (ref 3.5–5.3)
PROT SERPL-MCNC: 7.8 G/DL (ref 6.4–8.4)
SODIUM SERPL-SCNC: 135 MMOL/L (ref 135–147)
TIBC SERPL-MCNC: 282.8 UG/DL (ref 250–450)
TRANSFERRIN SERPL-MCNC: 202 MG/DL (ref 203–362)
UIBC SERPL-MCNC: 224 UG/DL (ref 155–355)

## 2025-01-20 ENCOUNTER — RESULTS FOLLOW-UP (OUTPATIENT)
Dept: FAMILY MEDICINE CLINIC | Facility: CLINIC | Age: 50
End: 2025-01-20

## 2025-01-20 DIAGNOSIS — D50.8 IRON DEFICIENCY ANEMIA SECONDARY TO INADEQUATE DIETARY IRON INTAKE: ICD-10-CM

## 2025-01-20 DIAGNOSIS — E53.8 B12 DEFICIENCY: ICD-10-CM

## 2025-01-20 DIAGNOSIS — K91.2 POSTSURGICAL MALABSORPTION: Primary | ICD-10-CM

## 2025-01-20 DIAGNOSIS — Z98.84 HISTORY OF ROUX-EN-Y GASTRIC BYPASS: ICD-10-CM

## 2025-01-27 ENCOUNTER — PROCEDURE VISIT (OUTPATIENT)
Dept: OBGYN CLINIC | Facility: CLINIC | Age: 50
End: 2025-01-27
Payer: COMMERCIAL

## 2025-01-27 VITALS — HEIGHT: 64 IN | WEIGHT: 163 LBS | BODY MASS INDEX: 27.83 KG/M2

## 2025-01-27 DIAGNOSIS — M17.0 BILATERAL PRIMARY OSTEOARTHRITIS OF KNEE: Primary | ICD-10-CM

## 2025-01-27 PROCEDURE — 20610 DRAIN/INJ JOINT/BURSA W/O US: CPT | Performed by: PHYSICIAN ASSISTANT

## 2025-01-27 RX ORDER — HYALURONATE SODIUM 10 MG/ML
20 SYRINGE (ML) INTRAARTICULAR
Status: COMPLETED | OUTPATIENT
Start: 2025-01-27 | End: 2025-01-27

## 2025-01-27 RX ADMIN — Medication 20 MG: at 08:00

## 2025-01-27 NOTE — PROGRESS NOTES
Patient has reported improvements from previous visco injections. Pain is rated at 4/10.  After discussing the options for treatment, the patient elected to proceed forward with Bilateral knee Euflexxa injections to reduce pain. Risks of injection, including but not limited to, post-injection pain, swelling, pseudo septic reaction, skin rash, itching, and infection were discussed in detail.  The patient understood, had no further questions and elected to proceed forward.  After sterile preparation, the Euflexxa injections were injected into the Bilateral knees, respectively.  The patient tolerated the procedure well no complications were noted.  The patient was instructed ice and elevate the extremity, limit strenuous activity for the next 2-3 days, and to contact us if there were any questions or concerns prior to their follow-up appointment.  I will see the patient back in 1 week for 3rd injections.        Large joint arthrocentesis: R knee  Universal Protocol:  Risks and benefits: risks, benefits and alternatives were discussed  Consent given by: patient  Patient identity confirmed: verbally with patient  Supporting Documentation  Indications: pain   Procedure Details  Location: knee - R knee  Needle size: 22 G  Medications administered: 20 mg Sodium Hyaluronate (Viscosup) 20 MG/2ML    Patient tolerance: patient tolerated the procedure well with no immediate complications  Dressing:  Sterile dressing applied      Large joint arthrocentesis: L knee  Universal Protocol:  Risks and benefits: risks, benefits and alternatives were discussed  Consent given by: patient  Patient identity confirmed: verbally with patient  Supporting Documentation  Indications: pain   Procedure Details  Location: knee - L knee  Needle size: 22 G  Approach: lateral  Medications administered: 20 mg Sodium Hyaluronate (Viscosup) 20 MG/2ML    Patient tolerance: patient tolerated the procedure well with no immediate complications  Dressing:   Sterile dressing applied

## 2025-02-06 ENCOUNTER — TELEPHONE (OUTPATIENT)
Dept: PLASTIC SURGERY | Facility: CLINIC | Age: 50
End: 2025-02-06

## 2025-02-06 NOTE — PROGRESS NOTES
Patient has reported improvements from previous visco injections. Pain is rated at 4/10.  After discussing the options for treatment, the patient elected to proceed forward with Bilateral knee Euflexxa injections to reduce pain. Risks of injection, including but not limited to, post-injection pain, swelling, pseudo septic reaction, skin rash, itching, and infection were discussed in detail.  The patient understood, had no further questions and elected to proceed forward.  After sterile preparation, the Euflexxa injections were injected into the Bilateral knee, respectively  The patient tolerated the procedure well no complications were noted.  The patient was instructed ice and elevate the extremity, limit strenuous activity for the next 2-3 days, and to contact us if there were any questions or concerns prior to their follow-up appointment.  I will see the patient back as needed.        Large joint arthrocentesis: R knee  Universal Protocol:  Risks and benefits: risks, benefits and alternatives were discussed  Consent given by: patient  Patient identity confirmed: verbally with patient  Supporting Documentation  Indications: pain   Procedure Details  Location: knee - R knee  Needle size: 22 G  Medications administered: 20 mg Sodium Hyaluronate (Viscosup) 20 MG/2ML    Patient tolerance: patient tolerated the procedure well with no immediate complications  Dressing:  Sterile dressing applied      Large joint arthrocentesis: L knee  Universal Protocol:  Risks and benefits: risks, benefits and alternatives were discussed  Consent given by: patient  Patient identity confirmed: verbally with patient  Supporting Documentation  Indications: pain   Procedure Details  Location: knee - L knee  Needle size: 22 G  Approach: lateral  Medications administered: 20 mg Sodium Hyaluronate (Viscosup) 20 MG/2ML    Patient tolerance: patient tolerated the procedure well with no immediate complications  Dressing:  Sterile dressing  applied

## 2025-02-07 ENCOUNTER — PROCEDURE VISIT (OUTPATIENT)
Dept: OBGYN CLINIC | Facility: CLINIC | Age: 50
End: 2025-02-07
Payer: COMMERCIAL

## 2025-02-07 VITALS — HEIGHT: 64 IN | WEIGHT: 163 LBS | BODY MASS INDEX: 27.83 KG/M2

## 2025-02-07 DIAGNOSIS — M17.0 BILATERAL PRIMARY OSTEOARTHRITIS OF KNEE: Primary | ICD-10-CM

## 2025-02-07 PROCEDURE — 20610 DRAIN/INJ JOINT/BURSA W/O US: CPT | Performed by: PHYSICIAN ASSISTANT

## 2025-02-07 RX ORDER — HYALURONATE SODIUM 10 MG/ML
20 SYRINGE (ML) INTRAARTICULAR
Status: COMPLETED | OUTPATIENT
Start: 2025-02-07 | End: 2025-02-07

## 2025-02-07 RX ADMIN — Medication 20 MG: at 08:00

## 2025-02-11 PROBLEM — D50.8 IRON DEFICIENCY ANEMIA SECONDARY TO INADEQUATE DIETARY IRON INTAKE: Status: ACTIVE | Noted: 2025-02-11

## 2025-02-11 RX ORDER — CYANOCOBALAMIN 1000 UG/ML
1000 INJECTION, SOLUTION INTRAMUSCULAR; SUBCUTANEOUS WEEKLY
Qty: 4 ML | Refills: 0 | Status: SHIPPED | OUTPATIENT
Start: 2025-02-11 | End: 2025-03-05

## 2025-02-11 RX ORDER — SODIUM CHLORIDE 9 MG/ML
20 INJECTION, SOLUTION INTRAVENOUS ONCE
OUTPATIENT
Start: 2025-02-17

## 2025-02-11 RX ORDER — NEEDLES, DISPOSABLE 27GX0.375"
NEEDLE, DISPOSABLE MISCELLANEOUS WEEKLY
Qty: 50 EACH | Refills: 0 | Status: SHIPPED | OUTPATIENT
Start: 2025-02-11

## 2025-02-14 NOTE — TELEPHONE ENCOUNTER
Royr Kraft,  An appointment was scheduled for you at the [facility].  Date: 02/28  Time: 12 pm    Please arrive 15 min prior to appt.  If unable to keep this appt, please call [phone] to re-schedule appt.    Also, please schedule all future appointments for the remaining course of treatment.    Thank you  Akhil

## 2025-02-27 ENCOUNTER — TELEPHONE (OUTPATIENT)
Dept: INFUSION CENTER | Facility: CLINIC | Age: 50
End: 2025-02-27

## 2025-02-27 NOTE — TELEPHONE ENCOUNTER
Left voicemail for patient regarding upcoming first appointment, verified appointment date/time, discussed infusion centers policies and procedures, callback # left for any questions.

## 2025-03-03 ENCOUNTER — HOSPITAL ENCOUNTER (OUTPATIENT)
Dept: INFUSION CENTER | Facility: CLINIC | Age: 50
Discharge: HOME | End: 2025-03-03
Payer: COMMERCIAL

## 2025-03-03 VITALS
SYSTOLIC BLOOD PRESSURE: 106 MMHG | DIASTOLIC BLOOD PRESSURE: 58 MMHG | RESPIRATION RATE: 79 BRPM | TEMPERATURE: 97.4 F | HEART RATE: 79 BPM

## 2025-03-03 DIAGNOSIS — Z98.84 HISTORY OF ROUX-EN-Y GASTRIC BYPASS: ICD-10-CM

## 2025-03-03 DIAGNOSIS — K91.2 POSTSURGICAL MALABSORPTION: Primary | ICD-10-CM

## 2025-03-03 DIAGNOSIS — D50.8 IRON DEFICIENCY ANEMIA SECONDARY TO INADEQUATE DIETARY IRON INTAKE: ICD-10-CM

## 2025-03-03 PROCEDURE — 96365 THER/PROPH/DIAG IV INF INIT: CPT

## 2025-03-03 RX ORDER — SODIUM CHLORIDE 9 MG/ML
20 INJECTION, SOLUTION INTRAVENOUS ONCE
Status: CANCELLED | OUTPATIENT
Start: 2025-03-10

## 2025-03-03 RX ORDER — SODIUM CHLORIDE 9 MG/ML
20 INJECTION, SOLUTION INTRAVENOUS ONCE
Status: COMPLETED | OUTPATIENT
Start: 2025-03-03 | End: 2025-03-03

## 2025-03-03 RX ADMIN — SODIUM CHLORIDE 20 ML/HR: 9 INJECTION, SOLUTION INTRAVENOUS at 07:58

## 2025-03-03 RX ADMIN — IRON SUCROSE 200 MG: 20 INJECTION, SOLUTION INTRAVENOUS at 08:00

## 2025-03-03 NOTE — PROGRESS NOTES
Pt here for venofer infusion, offering no complaints. Peripheral IV placed with positive blood return noted. Tolerated infusion without incident. Peripheral IV removed. AVS declined. Walked out in stable condition. Next appointment on 3/10/25 at 8am.

## 2025-03-06 ENCOUNTER — TELEPHONE (OUTPATIENT)
Dept: INFUSION CENTER | Facility: CLINIC | Age: 50
End: 2025-03-06

## 2025-03-06 NOTE — TELEPHONE ENCOUNTER
Patient called to move appt on 3/10/25 from 8am to later in the day due to conflicting Dr. Ferrara for her son. Infusion appt moved to 4PM. Pt aware.

## 2025-03-10 ENCOUNTER — HOSPITAL ENCOUNTER (OUTPATIENT)
Dept: INFUSION CENTER | Facility: CLINIC | Age: 50
Discharge: HOME | End: 2025-03-10
Payer: COMMERCIAL

## 2025-03-10 VITALS
HEART RATE: 72 BPM | TEMPERATURE: 97.4 F | DIASTOLIC BLOOD PRESSURE: 74 MMHG | RESPIRATION RATE: 18 BRPM | SYSTOLIC BLOOD PRESSURE: 110 MMHG

## 2025-03-10 DIAGNOSIS — D50.8 IRON DEFICIENCY ANEMIA SECONDARY TO INADEQUATE DIETARY IRON INTAKE: ICD-10-CM

## 2025-03-10 DIAGNOSIS — K91.2 POSTSURGICAL MALABSORPTION: Primary | ICD-10-CM

## 2025-03-10 DIAGNOSIS — Z98.84 HISTORY OF ROUX-EN-Y GASTRIC BYPASS: ICD-10-CM

## 2025-03-10 PROCEDURE — 96365 THER/PROPH/DIAG IV INF INIT: CPT

## 2025-03-10 RX ORDER — SODIUM CHLORIDE 9 MG/ML
20 INJECTION, SOLUTION INTRAVENOUS ONCE
Status: COMPLETED | OUTPATIENT
Start: 2025-03-10 | End: 2025-03-10

## 2025-03-10 RX ORDER — SODIUM CHLORIDE 9 MG/ML
20 INJECTION, SOLUTION INTRAVENOUS ONCE
Status: CANCELLED | OUTPATIENT
Start: 2025-03-17

## 2025-03-10 RX ADMIN — SODIUM CHLORIDE 20 ML/HR: 9 INJECTION, SOLUTION INTRAVENOUS at 15:56

## 2025-03-10 RX ADMIN — IRON SUCROSE 200 MG: 20 INJECTION, SOLUTION INTRAVENOUS at 15:58

## 2025-03-10 NOTE — PROGRESS NOTES
Pt here today for an iron infusion, offers no complaints, infusion completed w/o complications, aware of there next appt on 3/17 at 8 am, AVS declined and pt D/C home

## 2025-03-11 ENCOUNTER — PATIENT MESSAGE (OUTPATIENT)
Dept: FAMILY MEDICINE CLINIC | Facility: CLINIC | Age: 50
End: 2025-03-11

## 2025-03-12 ENCOUNTER — HOSPITAL ENCOUNTER (OUTPATIENT)
Dept: MAMMOGRAPHY | Facility: CLINIC | Age: 50
Discharge: HOME | End: 2025-03-12

## 2025-03-12 VITALS — BODY MASS INDEX: 27.83 KG/M2 | HEIGHT: 64 IN | WEIGHT: 163 LBS

## 2025-03-12 DIAGNOSIS — Z12.31 SCREENING MAMMOGRAM FOR BREAST CANCER: ICD-10-CM

## 2025-03-12 DIAGNOSIS — Z00.00 HEALTHCARE MAINTENANCE: Chronic | ICD-10-CM

## 2025-03-17 ENCOUNTER — CONSULT (OUTPATIENT)
Age: 50
End: 2025-03-17
Payer: COMMERCIAL

## 2025-03-17 ENCOUNTER — HOSPITAL ENCOUNTER (OUTPATIENT)
Dept: INFUSION CENTER | Facility: CLINIC | Age: 50
Discharge: HOME | End: 2025-03-17
Payer: COMMERCIAL

## 2025-03-17 VITALS — HEART RATE: 72 BPM | TEMPERATURE: 97.3 F | SYSTOLIC BLOOD PRESSURE: 109 MMHG | DIASTOLIC BLOOD PRESSURE: 70 MMHG

## 2025-03-17 DIAGNOSIS — D50.8 IRON DEFICIENCY ANEMIA SECONDARY TO INADEQUATE DIETARY IRON INTAKE: ICD-10-CM

## 2025-03-17 DIAGNOSIS — K91.2 POSTSURGICAL MALABSORPTION: Primary | ICD-10-CM

## 2025-03-17 DIAGNOSIS — Z98.84 HISTORY OF ROUX-EN-Y GASTRIC BYPASS: ICD-10-CM

## 2025-03-17 DIAGNOSIS — M79.3 PANNICULITIS: Primary | ICD-10-CM

## 2025-03-17 PROCEDURE — 99204 OFFICE O/P NEW MOD 45 MIN: CPT | Performed by: PLASTIC SURGERY

## 2025-03-17 PROCEDURE — 96365 THER/PROPH/DIAG IV INF INIT: CPT

## 2025-03-17 RX ORDER — NEEDLES, FILTER 19GX1 1/2"
NEEDLE, DISPOSABLE MISCELLANEOUS
COMMUNITY
Start: 2025-02-11

## 2025-03-17 RX ORDER — SODIUM CHLORIDE 9 MG/ML
20 INJECTION, SOLUTION INTRAVENOUS ONCE
Status: COMPLETED | OUTPATIENT
Start: 2025-03-17 | End: 2025-03-17

## 2025-03-17 RX ORDER — SODIUM CHLORIDE 9 MG/ML
20 INJECTION, SOLUTION INTRAVENOUS ONCE
OUTPATIENT
Start: 2025-03-25

## 2025-03-17 RX ADMIN — IRON SUCROSE 200 MG: 20 INJECTION, SOLUTION INTRAVENOUS at 08:02

## 2025-03-17 RX ADMIN — SODIUM CHLORIDE 20 ML/HR: 9 INJECTION, SOLUTION INTRAVENOUS at 08:02

## 2025-03-17 NOTE — PROGRESS NOTES
Assessment & Plan   50yo female with panniculitis s/p massive weight loss  1.) Patient is a good candidate for vertical and horizontal panniculectomy  2.) Because of the constellation and chronicity of her symptoms I believe this is a medically necessary procedure  3.) Pictures taken will inquire as to insurance authorization  4.) All her questions answered to her satisfaction      Discussion--  I discussed with her the option of panniculectomy, including the nature of panniculectomy, the nature of rectus plication, we discussed the risk of bleeding, infection, scarring, poor wound healing, damage underlying structures, need for further surgery, need for multiple procedures, loss of umbilicus, poor aesthetic result, scar migration, need for revision, contour deformity, the risk of seroma, DVT, poor aesthetic result, need for revision, need for multiple procedures.  I discussed with the patient the option of vertical and horizontal panniculectomy, discussed with her the risks, benefits, alternatives of that procedure including the above with increased risk of wound healing complication particularly at the triple point, we discussed the risk of scar migration, relapse of skin, asymmetry, need for further surgery, need for multiple procedures, we discussed the risk of poor scarring, distortion of the umbilical structures, distortion of the labial structures, I discussed with her that panniculectomy may be approved by insurance but rectus plication is traditionally not.  All the patient's questions were answered to her satisfaction, because of the constellation chronicity of her symptoms I do believe this is a medically necessary procedure.  The patient is an excellent candidate for vertical horizontal panniculectomy.  Pictures taken, will inquire as to insurance authorization.    Encounter dominated visit, total Time 35 Minutes in Total Visit to 45 Including Documentation, Review of Her Chart and Coordination of  Care.      Subjective   Patient ID: Abena Friedman is a 49 y.o. female.    There were no vitals filed for this visit.  HPI      Patient is a 49 with a history of bariatric surgery.  The patient lost approximately 120 lbs overall and now has significant loose hanging skin and isn't having with her abdominal shape and contour.  The patient complains of rashing, chronic irritation in the folds of her skin.  She has redness in the area that is particularly worse with increased activity and limits her overall daily function.  The patient has tried multiple medications and medical treatment has failed to adequately resolve the issue.  She is here today to discuss surgical options.  She is a non smoker, but she does not take steroids or blood thinners, she does not have a history of DVT.  She is due for her mammogram      The following portions of the patient's history were reviewed and updated as appropriate: allergies, current medications, past family history, past medical history, past social history, past surgical history, and problem list.    Review of Systems   Skin:  Positive for rash and wound.       Objective   Physical Exam   Constitutional  She appears well-developed and well-nourished.     Eyes  Pupils are equal, round, and reactive to light. System normal.     General -             Right: Right eye extraocular movements are normal.             Left: Left eye extraocular movements are normal.       Skin    large amount of excess skin in the vertical and horizontal component, she has skin hanging well to her pubis, she has acute and chronic inflammation and irritation under her pannus but no acute cellulitis.       Psychiatric  She has a normal mood and affect. Her behavior is normal. Judgment and thought content normal.       Past Medical History:   Diagnosis Date    Arthritis     Nikole-menopause      Past Surgical History:   Procedure Laterality Date     SECTION  2010    GASTRIC BYPASS  2017     Current  "Outpatient Medications   Medication Instructions    acetaminophen (TYLENOL) 650 mg, Oral, Every 8 hours PRN    BD Integra Syringe 25G X 1\" 3 ML MISC USE ONCE WEEKLY    cyanocobalamin 1,000 mcg, Intramuscular, Weekly    Diclofenac Sodium (VOLTAREN) 2 g, Topical, 4 times daily    nystatin (MYCOSTATIN) powder Topical, 3 times daily    pantoprazole (PROTONIX) 40 mg, Oral, Daily    polyethylene glycol (MIRALAX) 17 g, Oral, Daily    SUMAtriptan (IMITREX) 50 mg, Oral, Once as needed, may repeat in 2 hours if necessary    SYRINGE-NEEDLE, DISP, 3 ML (BD Eclipse Syringe/Needle) 23G X 1-1/2\" 3 ML MISC Does not apply, Weekly    Vitamin K2 200 mcg, Oral, Daily with lunch, Take with Vitamin D3    Zepbound 10 MG/0.5ML auto-injector INJECT 10 MG UNDER THE SKIN ONCE WEEKLY       Social History     Social History Narrative    Not on file     Social History     Tobacco Use   Smoking Status Never    Passive exposure: Never   Smokeless Tobacco Never           "

## 2025-03-17 NOTE — PROGRESS NOTES
Pt here for IV venofer, offering no complaints. PIV established with positive blood return noted. Tolerated entire infusion without complications. PIV flushed and removed. AVS declined. Next appt 3/25 8am. Walked out in stable condition.

## 2025-04-23 ENCOUNTER — OFFICE VISIT (OUTPATIENT)
Dept: FAMILY MEDICINE CLINIC | Facility: CLINIC | Age: 50
End: 2025-04-23
Payer: COMMERCIAL

## 2025-04-23 VITALS
RESPIRATION RATE: 16 BRPM | SYSTOLIC BLOOD PRESSURE: 118 MMHG | WEIGHT: 163.6 LBS | BODY MASS INDEX: 27.93 KG/M2 | TEMPERATURE: 98.8 F | DIASTOLIC BLOOD PRESSURE: 60 MMHG | HEIGHT: 64 IN

## 2025-04-23 DIAGNOSIS — R00.2 PALPITATIONS: ICD-10-CM

## 2025-04-23 DIAGNOSIS — L30.4 INTERTRIGO: ICD-10-CM

## 2025-04-23 DIAGNOSIS — Z98.84 HISTORY OF ROUX-EN-Y GASTRIC BYPASS: ICD-10-CM

## 2025-04-23 DIAGNOSIS — D50.8 IRON DEFICIENCY ANEMIA SECONDARY TO INADEQUATE DIETARY IRON INTAKE: ICD-10-CM

## 2025-04-23 DIAGNOSIS — E55.9 VITAMIN D DEFICIENCY: ICD-10-CM

## 2025-04-23 DIAGNOSIS — M17.12 PATELLOFEMORAL ARTHRITIS OF LEFT KNEE: ICD-10-CM

## 2025-04-23 DIAGNOSIS — F32.2 SEVERE MAJOR DEPRESSIVE DISORDER (HCC): ICD-10-CM

## 2025-04-23 DIAGNOSIS — E53.8 B12 DEFICIENCY: Primary | ICD-10-CM

## 2025-04-23 PROCEDURE — 99214 OFFICE O/P EST MOD 30 MIN: CPT | Performed by: FAMILY MEDICINE

## 2025-04-23 NOTE — PROGRESS NOTES
Name: Abena Friedman      : 1975      MRN: [id number]  Encounter Provider: Marvin Cabezas MD  Encounter Date: 2025   Encounter department: [facility]  :  Assessment & Plan  B12 deficiency  Known history of B12 deficiency secondary to postsurgical malabsorption.  Was self injecting IM B12.  Will recheck levels and determine if we need to continue iron supplementation.  Orders:    Vitamin B12; Future    History of John-en-Y gastric bypass         Vitamin D deficiency  Previously low.  No longer taking vitamin D       Iron deficiency anemia secondary to inadequate dietary iron intake  Lab Results   Component Value Date    WBC 6.12 2025    HGB 13.4 2025    HCT 41.9 2025    MCV 86 2025     2025     Due for repeat labs.  Reports palpitations started 2 weeks ago.  They occur 1-2 times a week and last 2 to 3 hours.  States she is hydrating well.  Is perimenopausal and awaiting appointment with Dr. Erik Cervantes.     Orders:    Iron Panel (Includes Ferritin, Iron Sat%, Iron, and TIBC); Future    Palpitations  Started 2 weeks ago.  Exam unremarkable.  Heart rate within normal limits.  Likely multifactorial (anemia, perimenopause, deficiency, or anxiety).  Will get labs.  Asked to monitor symptoms.  Orders:    Magnesium; Future    Phosphorus; Future    Severe major depressive disorder (HCC)  Reports feeling depressed.  Family reports patient being irritable.  She attributes to perimenopause.  Tearful and lacks motivation.  Chronic left knee also affecting her mood.  Hopes to move to a region that is warm to help alleviate her joint pain.  Notices improvement in the warmer weather and is looking forward to being out in the sun.  We offered antidepressant but declined.  She would rather wait to meet with menopause specialist to discuss supplements or medications that would help alleviate her symptoms that may be due to being perimenopausal      "    Patellofemoral arthritis of left knee  Acute on chronic left knee pain.  Last viscous injection was ineffective.  Patient is applying warmth to the knee.  Other topical agents are ineffective.       Intertrigo  Met with the plastic surgeon was told that she would be a good surgical candidate for panniculectomy.  Patient waiting for insurance approval              History of Present Illness   Here for follow-up.    Reports palpitations that started 2 weeks ago.  Occurs 1-2 times a week and lasts 2 to 3 hours.  Hydrating well.  Has a history of anemia and B12 deficiency for which she was receiving iron infusions.  Patient was also self injecting IM B12.  Last period  was 3 weeks ago. Patient is perimenopausal with vasomotor symptoms.  States that the hot flashes have actually subsided with Zepbound.  Waiting to meet with the menopause specialist later this year.  Family mention that she is irritable.  Patient mood fluctuates very often.   She also reports left knee pain.  This is chronic.  Was receiving viscous injections in the past and would last 2 years.  Prior to moving to [place], patient was following with a pain specialist in [place].  She recently established with a specialist within the network who performed the viscous injection.  Patient states the pain worsened afterwards.  Tends to do well in the warmer weather.  [place] son graduates high school.    Review of Systems   Constitutional:  Positive for fatigue.        Hot flashes    Respiratory: Negative.     Cardiovascular: Negative.    Gastrointestinal:  Positive for nausea.   Musculoskeletal:  Positive for arthralgias (left knee pain).   Psychiatric/Behavioral:  Positive for agitation, dysphoric mood and sleep disturbance. The patient is nervous/anxious.        Objective   /60 (BP Location: Left arm, Patient Position: Sitting, Cuff Size: Standard)   Temp 98.8 °F (37.1 °C) (Tympanic)   Resp 16   Ht 5' 3.5\" (1.613 m)  "  Wt 74.2 kg (163 lb 9.6 oz)   BMI 28.53 kg/m²      Physical Exam  Vitals reviewed.   Constitutional:       General: She is not in acute distress.     Appearance: Normal appearance. She is not ill-appearing or toxic-appearing.   HENT:      Head: Normocephalic and atraumatic.   Eyes:      Extraocular Movements: Extraocular movements intact.   Cardiovascular:      Rate and Rhythm: Normal rate and regular rhythm.      Heart sounds: No murmur heard.  Pulmonary:      Effort: Pulmonary effort is normal.      Breath sounds: Normal breath sounds.   Abdominal:      General: There is no distension.      Palpations: Abdomen is soft. There is no mass.      Tenderness: There is no abdominal tenderness. There is no guarding or rebound.      Hernia: No hernia is present.   Skin:     General: Skin is warm.   Neurological:      General: No focal deficit present.      Mental Status: She is alert.   Psychiatric:         Mood and Affect: Mood normal.         Behavior: Behavior normal.

## 2025-04-25 NOTE — ASSESSMENT & PLAN NOTE
Lab Results   Component Value Date    WBC 6.12 01/16/2025    HGB 13.4 01/16/2025    HCT 41.9 01/16/2025    MCV 86 01/16/2025     01/16/2025     Due for repeat labs.  Reports palpitations started 2 weeks ago.  They occur 1-2 times a week and last 2 to 3 hours.  States she is hydrating well.  Is perimenopausal and awaiting appointment with Dr. Erik Cervantes.     Orders:    Iron Panel (Includes Ferritin, Iron Sat%, Iron, and TIBC); Future

## 2025-04-25 NOTE — ASSESSMENT & PLAN NOTE
Acute on chronic left knee pain.  Last viscous injection was ineffective.  Patient is applying warmth to the knee.  Other topical agents are ineffective.

## 2025-04-25 NOTE — ASSESSMENT & PLAN NOTE
Reports feeling depressed.  Family reports patient being irritable.  She attributes to perimenopause.  Tearful and lacks motivation.  Chronic left knee also affecting her mood.  Hopes to move to a region that is warm to help alleviate her joint pain.  Notices improvement in the warmer weather and is looking forward to being out in the sun.  We offered antidepressant but declined.  She would rather wait to meet with menopause specialist to discuss supplements or medications that would help alleviate her symptoms that may be due to being perimenopausal

## 2025-04-30 ENCOUNTER — APPOINTMENT (OUTPATIENT)
Age: 50
End: 2025-04-30
Payer: COMMERCIAL

## 2025-04-30 DIAGNOSIS — R00.2 PALPITATIONS: ICD-10-CM

## 2025-04-30 DIAGNOSIS — E53.8 B12 DEFICIENCY: ICD-10-CM

## 2025-04-30 DIAGNOSIS — D50.8 IRON DEFICIENCY ANEMIA SECONDARY TO INADEQUATE DIETARY IRON INTAKE: ICD-10-CM

## 2025-04-30 LAB
FERRITIN SERPL-MCNC: 57 NG/ML (ref 30–307)
IRON SATN MFR SERPL: 23 % (ref 15–50)
IRON SERPL-MCNC: 86 UG/DL (ref 50–212)
MAGNESIUM SERPL-MCNC: 2.1 MG/DL (ref 1.9–2.7)
PHOSPHATE SERPL-MCNC: 3.7 MG/DL (ref 2.7–4.5)
TIBC SERPL-MCNC: 366.8 UG/DL (ref 250–450)
TRANSFERRIN SERPL-MCNC: 262 MG/DL (ref 203–362)
UIBC SERPL-MCNC: 281 UG/DL (ref 155–355)
VIT B12 SERPL-MCNC: 139 PG/ML (ref 180–914)

## 2025-04-30 PROCEDURE — 82607 VITAMIN B-12: CPT

## 2025-04-30 PROCEDURE — 83550 IRON BINDING TEST: CPT

## 2025-04-30 PROCEDURE — 84100 ASSAY OF PHOSPHORUS: CPT

## 2025-04-30 PROCEDURE — 83735 ASSAY OF MAGNESIUM: CPT

## 2025-04-30 PROCEDURE — 83540 ASSAY OF IRON: CPT

## 2025-04-30 PROCEDURE — 82728 ASSAY OF FERRITIN: CPT

## 2025-04-30 PROCEDURE — 36415 COLL VENOUS BLD VENIPUNCTURE: CPT

## 2025-05-05 ENCOUNTER — PATIENT MESSAGE (OUTPATIENT)
Dept: FAMILY MEDICINE CLINIC | Facility: CLINIC | Age: 50
End: 2025-05-05

## 2025-05-05 DIAGNOSIS — R00.2 PALPITATIONS: Primary | ICD-10-CM

## 2025-05-05 DIAGNOSIS — R53.83 FATIGUE, UNSPECIFIED TYPE: ICD-10-CM

## 2025-05-07 ENCOUNTER — RESULTS FOLLOW-UP (OUTPATIENT)
Dept: FAMILY MEDICINE CLINIC | Facility: CLINIC | Age: 50
End: 2025-05-07

## 2025-05-07 ENCOUNTER — PATIENT MESSAGE (OUTPATIENT)
Dept: FAMILY MEDICINE CLINIC | Facility: CLINIC | Age: 50
End: 2025-05-07

## 2025-05-07 DIAGNOSIS — K91.2 POSTSURGICAL MALABSORPTION: Primary | ICD-10-CM

## 2025-05-07 DIAGNOSIS — Z98.84 HISTORY OF ROUX-EN-Y GASTRIC BYPASS: ICD-10-CM

## 2025-05-07 DIAGNOSIS — E53.8 B12 DEFICIENCY: ICD-10-CM

## 2025-05-09 RX ORDER — NEEDLES, DISPOSABLE 27GX0.375"
NEEDLE, DISPOSABLE MISCELLANEOUS WEEKLY
Qty: 50 EACH | Refills: 0 | Status: SHIPPED | OUTPATIENT
Start: 2025-05-09

## 2025-05-09 RX ORDER — CYANOCOBALAMIN 1000 UG/ML
1000 INJECTION, SOLUTION INTRAMUSCULAR; SUBCUTANEOUS WEEKLY
Qty: 4 ML | Refills: 0 | Status: SHIPPED | OUTPATIENT
Start: 2025-05-09 | End: 2025-05-31

## 2025-05-14 ENCOUNTER — TELEPHONE (OUTPATIENT)
Age: 50
End: 2025-05-14

## 2025-05-14 NOTE — TELEPHONE ENCOUNTER
Called [address] [place] [address] insurance and spoke with Danielle MEJIA, [phone].  Submitted to [employer] for authorization, also faxed over documentation for review, to FAX# [phone].  Called patient and left message informing the patient, also informed her that the insurance can take 6-8 weeks for determination.    [address] [place] [address]-Pending AUTH# [id number]

## 2025-05-16 NOTE — TELEPHONE ENCOUNTER
Erendira with [employer] looking to speak with Cece T       The line went dead when trying to get in touch with Cece

## 2025-05-20 ENCOUNTER — TELEPHONE (OUTPATIENT)
Age: 50
End: 2025-05-20

## 2025-05-20 NOTE — TELEPHONE ENCOUNTER
Received message from Erendira at [facility]([id number]).  Erendira states that the photos that were submitted with documentation for auth, were not clear.  Erendira is requesting the photos to be re sent through a secure email, she will send me the link.  Erendira is also requesting additional documentation in regards to the pt's weight loss and Zepbound maintenance.  Sent photos, along with additional documentation vis the secured email.  Erendira sts that she will review and then contact me, once the determination has been made.

## 2025-06-09 ENCOUNTER — PREP FOR PROCEDURE (OUTPATIENT)
Age: 50
End: 2025-06-09

## 2025-06-09 DIAGNOSIS — M79.3 PANNICULITIS: Primary | ICD-10-CM

## 2025-06-20 ENCOUNTER — HOSPITAL ENCOUNTER (OUTPATIENT)
Dept: NON INVASIVE DIAGNOSTICS | Facility: CLINIC | Age: 50
Discharge: HOME | End: 2025-06-20
Attending: FAMILY MEDICINE
Payer: COMMERCIAL

## 2025-06-20 VITALS
HEIGHT: 63 IN | HEART RATE: 70 BPM | DIASTOLIC BLOOD PRESSURE: 60 MMHG | SYSTOLIC BLOOD PRESSURE: 118 MMHG | WEIGHT: 163 LBS | BODY MASS INDEX: 28.88 KG/M2

## 2025-06-20 DIAGNOSIS — R00.2 PALPITATIONS: ICD-10-CM

## 2025-06-20 DIAGNOSIS — R53.83 FATIGUE, UNSPECIFIED TYPE: ICD-10-CM

## 2025-06-20 LAB
AORTIC ROOT: 2.9 CM
BSA FOR ECHO PROCEDURE: 1.77 M2
DOP CALC LVOT AREA: 2.54 CM2
DOP CALC LVOT DIAMETER: 1.8 CM
E WAVE DECELERATION TIME: 207 MS
E/A RATIO: 1.49
FRACTIONAL SHORTENING: 31 (ref 28–44)
INTERVENTRICULAR SEPTUM IN DIASTOLE (PARASTERNAL SHORT AXIS VIEW): 0.8 CM
INTERVENTRICULAR SEPTUM: 0.8 CM (ref 0.6–1.1)
LAAS-AP2: 17.2 CM2
LAAS-AP4: 16.7 CM2
LEFT ATRIUM SIZE: 3.1 CM
LEFT ATRIUM VOLUME (MOD BIPLANE): 50 ML
LEFT ATRIUM VOLUME INDEX (MOD BIPLANE): 27.9 ML/M2
LEFT INTERNAL DIMENSION IN SYSTOLE: 2.7 CM (ref 2.1–4)
LEFT VENTRICULAR INTERNAL DIMENSION IN DIASTOLE: 3.9 CM (ref 3.5–6)
LEFT VENTRICULAR POSTERIOR WALL IN END DIASTOLE: 0.9 CM
LEFT VENTRICULAR STROKE VOLUME: 40 ML
LV EF US.2D.A4C+ESTIMATED: 61 %
LVSV (TEICH): 40 ML
MV E'TISSUE VEL-LAT: 11 CM/S
MV E'TISSUE VEL-SEP: 10 CM/S
MV PEAK A VEL: 0.39 M/S
MV PEAK E VEL: 58 CM/S
RIGHT ATRIUM AREA SYSTOLE A4C: 13.2 CM2
RIGHT VENTRICLE ID DIMENSION: 2.7 CM
SL CV LEFT ATRIUM LENGTH A2C: 4.6 CM
SL CV LV EF: 60
SL CV PED ECHO LEFT VENTRICLE DIASTOLIC VOLUME (MOD BIPLANE) 2D: 67 ML
SL CV PED ECHO LEFT VENTRICLE SYSTOLIC VOLUME (MOD BIPLANE) 2D: 28 ML
TRICUSPID ANNULAR PLANE SYSTOLIC EXCURSION: 1.8 CM

## 2025-06-20 PROCEDURE — 93306 TTE W/DOPPLER COMPLETE: CPT | Performed by: INTERNAL MEDICINE

## 2025-06-20 PROCEDURE — 93226 XTRNL ECG REC<48 HR SCAN A/R: CPT

## 2025-06-20 PROCEDURE — 93225 XTRNL ECG REC<48 HRS REC: CPT

## 2025-06-20 PROCEDURE — 93306 TTE W/DOPPLER COMPLETE: CPT

## 2025-06-24 ENCOUNTER — RESULTS FOLLOW-UP (OUTPATIENT)
Dept: FAMILY MEDICINE CLINIC | Facility: CLINIC | Age: 50
End: 2025-06-24

## 2025-06-24 PROCEDURE — 93227 XTRNL ECG REC<48 HR R&I: CPT | Performed by: INTERNAL MEDICINE

## 2025-06-24 NOTE — TELEPHONE ENCOUNTER
"Patient returned call, message in chart from provider relayed.  Please clarify last sentence and return her call.   \"it is pumping well and there is very \"leak\" in one of the valves.\"   "

## 2025-07-07 ENCOUNTER — PATIENT MESSAGE (OUTPATIENT)
Dept: FAMILY MEDICINE CLINIC | Facility: CLINIC | Age: 50
End: 2025-07-07

## 2025-07-09 ENCOUNTER — OFFICE VISIT (OUTPATIENT)
Dept: FAMILY MEDICINE CLINIC | Facility: CLINIC | Age: 50
End: 2025-07-09
Payer: COMMERCIAL

## 2025-07-09 VITALS
RESPIRATION RATE: 17 BRPM | SYSTOLIC BLOOD PRESSURE: 118 MMHG | TEMPERATURE: 97.9 F | HEIGHT: 63 IN | BODY MASS INDEX: 27.82 KG/M2 | WEIGHT: 157 LBS | DIASTOLIC BLOOD PRESSURE: 60 MMHG | HEART RATE: 73 BPM | OXYGEN SATURATION: 99 %

## 2025-07-09 DIAGNOSIS — F32.2 SEVERE MAJOR DEPRESSIVE DISORDER (HCC): ICD-10-CM

## 2025-07-09 DIAGNOSIS — R00.2 PALPITATIONS: ICD-10-CM

## 2025-07-09 DIAGNOSIS — E53.8 B12 DEFICIENCY: Primary | ICD-10-CM

## 2025-07-09 DIAGNOSIS — E55.9 VITAMIN D DEFICIENCY: ICD-10-CM

## 2025-07-09 DIAGNOSIS — H81.13 BENIGN PAROXYSMAL POSITIONAL VERTIGO DUE TO BILATERAL VESTIBULAR DISORDER: ICD-10-CM

## 2025-07-09 PROCEDURE — 99214 OFFICE O/P EST MOD 30 MIN: CPT | Performed by: FAMILY MEDICINE

## 2025-07-25 NOTE — ASSESSMENT & PLAN NOTE
Mostly stress related. Not wanting to go to work, irritable, decreased concentration, tearful, headaches, and palpitations. We discussed FMLA to allow her time off or accomodation. Pt will contact HR to request forms. Would prefer to work from home for several months rather than go into the city due to the anxiety.

## 2025-07-28 ENCOUNTER — ANESTHESIA (OUTPATIENT)
Dept: GASTROENTEROLOGY | Facility: HOSPITAL | Age: 50
End: 2025-07-28
Payer: COMMERCIAL

## 2025-07-28 ENCOUNTER — ANESTHESIA EVENT (OUTPATIENT)
Dept: GASTROENTEROLOGY | Facility: HOSPITAL | Age: 50
End: 2025-07-28
Payer: COMMERCIAL

## 2025-07-28 ENCOUNTER — HOSPITAL ENCOUNTER (OUTPATIENT)
Dept: GASTROENTEROLOGY | Facility: HOSPITAL | Age: 50
Setting detail: OUTPATIENT SURGERY
Discharge: HOME | End: 2025-07-28
Attending: PHYSICIAN ASSISTANT
Payer: COMMERCIAL

## 2025-07-28 VITALS
DIASTOLIC BLOOD PRESSURE: 54 MMHG | HEART RATE: 67 BPM | OXYGEN SATURATION: 100 % | TEMPERATURE: 97 F | RESPIRATION RATE: 14 BRPM | SYSTOLIC BLOOD PRESSURE: 104 MMHG

## 2025-07-28 DIAGNOSIS — Z12.11 SCREEN FOR COLON CANCER: ICD-10-CM

## 2025-07-28 LAB
EXT PREGNANCY TEST URINE: NEGATIVE
EXT. CONTROL: NORMAL

## 2025-07-28 PROCEDURE — 81025 URINE PREGNANCY TEST: CPT | Performed by: ANESTHESIOLOGY

## 2025-07-28 PROCEDURE — G0121 COLON CA SCRN NOT HI RSK IND: HCPCS | Performed by: INTERNAL MEDICINE

## 2025-07-28 RX ORDER — SODIUM CHLORIDE, SODIUM LACTATE, POTASSIUM CHLORIDE, CALCIUM CHLORIDE 600; 310; 30; 20 MG/100ML; MG/100ML; MG/100ML; MG/100ML
INJECTION, SOLUTION INTRAVENOUS CONTINUOUS PRN
Status: DISCONTINUED | OUTPATIENT
Start: 2025-07-28 | End: 2025-07-28

## 2025-07-28 RX ORDER — ONDANSETRON 2 MG/ML
4 INJECTION INTRAMUSCULAR; INTRAVENOUS ONCE AS NEEDED
Status: CANCELLED | OUTPATIENT
Start: 2025-07-28

## 2025-07-28 RX ORDER — LIDOCAINE HYDROCHLORIDE 20 MG/ML
INJECTION, SOLUTION EPIDURAL; INFILTRATION; INTRACAUDAL; PERINEURAL AS NEEDED
Status: DISCONTINUED | OUTPATIENT
Start: 2025-07-28 | End: 2025-07-28

## 2025-07-28 RX ORDER — SODIUM CHLORIDE, SODIUM LACTATE, POTASSIUM CHLORIDE, CALCIUM CHLORIDE 600; 310; 30; 20 MG/100ML; MG/100ML; MG/100ML; MG/100ML
125 INJECTION, SOLUTION INTRAVENOUS CONTINUOUS
Status: CANCELLED | OUTPATIENT
Start: 2025-07-28

## 2025-07-28 RX ORDER — PROPOFOL 10 MG/ML
INJECTION, EMULSION INTRAVENOUS AS NEEDED
Status: DISCONTINUED | OUTPATIENT
Start: 2025-07-28 | End: 2025-07-28

## 2025-07-28 RX ADMIN — LIDOCAINE HYDROCHLORIDE 80 MG: 20 INJECTION, SOLUTION EPIDURAL; INFILTRATION; INTRACAUDAL; PERINEURAL at 11:43

## 2025-07-28 RX ADMIN — PROPOFOL 120 MG: 10 INJECTION, EMULSION INTRAVENOUS at 11:43

## 2025-07-28 RX ADMIN — SODIUM CHLORIDE, SODIUM LACTATE, POTASSIUM CHLORIDE, AND CALCIUM CHLORIDE: .6; .31; .03; .02 INJECTION, SOLUTION INTRAVENOUS at 11:16

## 2025-07-28 RX ADMIN — PROPOFOL 80 MG: 10 INJECTION, EMULSION INTRAVENOUS at 11:48

## 2025-08-07 ENCOUNTER — TELEPHONE (OUTPATIENT)
Age: 50
End: 2025-08-07

## 2025-08-07 ENCOUNTER — OFFICE VISIT (OUTPATIENT)
Dept: OBGYN CLINIC | Facility: MEDICAL CENTER | Age: 50
End: 2025-08-07
Payer: COMMERCIAL

## 2025-08-07 VITALS
BODY MASS INDEX: 27.82 KG/M2 | DIASTOLIC BLOOD PRESSURE: 70 MMHG | SYSTOLIC BLOOD PRESSURE: 124 MMHG | WEIGHT: 157 LBS | HEIGHT: 63 IN

## 2025-08-07 DIAGNOSIS — N95.9 PREMENOPAUSAL PATIENT: Primary | ICD-10-CM

## 2025-08-07 DIAGNOSIS — N94.10 DYSPAREUNIA, FEMALE: ICD-10-CM

## 2025-08-07 DIAGNOSIS — Z30.015 ENCOUNTER FOR INITIAL PRESCRIPTION OF VAGINAL RING HORMONAL CONTRACEPTIVE: ICD-10-CM

## 2025-08-07 PROCEDURE — 99215 OFFICE O/P EST HI 40 MIN: CPT | Performed by: NURSE PRACTITIONER

## 2025-08-07 RX ORDER — ETONOGESTREL AND ETHINYL ESTRADIOL VAGINAL RING .015; .12 MG/D; MG/D
RING VAGINAL
Qty: 1 EACH | Refills: 2 | Status: SHIPPED | OUTPATIENT
Start: 2025-08-07

## 2025-08-07 RX ORDER — ESTRADIOL 0.1 MG/G
CREAM VAGINAL
Qty: 42.5 G | Refills: 2 | Status: SHIPPED | OUTPATIENT
Start: 2025-08-07

## 2025-08-11 ENCOUNTER — TELEPHONE (OUTPATIENT)
Dept: NEUROLOGY | Facility: CLINIC | Age: 50
End: 2025-08-11

## 2025-08-14 ENCOUNTER — OFFICE VISIT (OUTPATIENT)
Age: 50
End: 2025-08-14
Payer: COMMERCIAL

## 2025-08-21 PROBLEM — M19.90 OSTEOARTHRITIS: Status: ACTIVE | Noted: 2025-08-21
